# Patient Record
Sex: FEMALE | Race: OTHER | HISPANIC OR LATINO | URBAN - METROPOLITAN AREA
[De-identification: names, ages, dates, MRNs, and addresses within clinical notes are randomized per-mention and may not be internally consistent; named-entity substitution may affect disease eponyms.]

---

## 2018-10-05 ENCOUNTER — INPATIENT (INPATIENT)
Facility: HOSPITAL | Age: 63
LOS: 1 days | Discharge: HOME | DRG: 871 | End: 2018-10-07
Attending: HOSPITALIST | Admitting: HOSPITALIST
Payer: COMMERCIAL

## 2018-10-05 VITALS
RESPIRATION RATE: 16 BRPM | SYSTOLIC BLOOD PRESSURE: 170 MMHG | WEIGHT: 156.97 LBS | TEMPERATURE: 99 F | HEART RATE: 91 BPM | OXYGEN SATURATION: 94 % | HEIGHT: 62 IN | DIASTOLIC BLOOD PRESSURE: 87 MMHG

## 2018-10-05 DIAGNOSIS — Z29.9 ENCOUNTER FOR PROPHYLACTIC MEASURES, UNSPECIFIED: ICD-10-CM

## 2018-10-05 DIAGNOSIS — Z98.89 OTHER SPECIFIED POSTPROCEDURAL STATES: Chronic | ICD-10-CM

## 2018-10-05 DIAGNOSIS — E11.9 TYPE 2 DIABETES MELLITUS WITHOUT COMPLICATIONS: ICD-10-CM

## 2018-10-05 DIAGNOSIS — J18.9 PNEUMONIA, UNSPECIFIED ORGANISM: ICD-10-CM

## 2018-10-05 DIAGNOSIS — I10 ESSENTIAL (PRIMARY) HYPERTENSION: ICD-10-CM

## 2018-10-05 DIAGNOSIS — Z98.42 CATARACT EXTRACTION STATUS, LEFT EYE: Chronic | ICD-10-CM

## 2018-10-05 DIAGNOSIS — Z90.49 ACQUIRED ABSENCE OF OTHER SPECIFIED PARTS OF DIGESTIVE TRACT: Chronic | ICD-10-CM

## 2018-10-05 DIAGNOSIS — K21.9 GASTRO-ESOPHAGEAL REFLUX DISEASE WITHOUT ESOPHAGITIS: ICD-10-CM

## 2018-10-05 DIAGNOSIS — E87.2 ACIDOSIS: ICD-10-CM

## 2018-10-05 DIAGNOSIS — J45.909 UNSPECIFIED ASTHMA, UNCOMPLICATED: ICD-10-CM

## 2018-10-05 LAB
ALBUMIN SERPL ELPH-MCNC: 3.1 G/DL — LOW (ref 3.3–5)
ALP SERPL-CCNC: 129 U/L — HIGH (ref 40–120)
ALT FLD-CCNC: 27 U/L — SIGNIFICANT CHANGE UP (ref 12–78)
ANION GAP SERPL CALC-SCNC: 7 MMOL/L — SIGNIFICANT CHANGE UP (ref 5–17)
APPEARANCE UR: CLEAR — SIGNIFICANT CHANGE UP
AST SERPL-CCNC: 15 U/L — SIGNIFICANT CHANGE UP (ref 15–37)
BASOPHILS # BLD AUTO: 0 K/UL — SIGNIFICANT CHANGE UP (ref 0–0.2)
BASOPHILS NFR BLD AUTO: 0 % — SIGNIFICANT CHANGE UP (ref 0–2)
BILIRUB SERPL-MCNC: 0.3 MG/DL — SIGNIFICANT CHANGE UP (ref 0.2–1.2)
BILIRUB UR-MCNC: NEGATIVE — SIGNIFICANT CHANGE UP
BUN SERPL-MCNC: 11 MG/DL — SIGNIFICANT CHANGE UP (ref 7–23)
CALCIUM SERPL-MCNC: 9.1 MG/DL — SIGNIFICANT CHANGE UP (ref 8.5–10.1)
CHLORIDE SERPL-SCNC: 101 MMOL/L — SIGNIFICANT CHANGE UP (ref 96–108)
CO2 SERPL-SCNC: 31 MMOL/L — SIGNIFICANT CHANGE UP (ref 22–31)
COLOR SPEC: YELLOW — SIGNIFICANT CHANGE UP
CREAT SERPL-MCNC: 0.77 MG/DL — SIGNIFICANT CHANGE UP (ref 0.5–1.3)
DIFF PNL FLD: ABNORMAL
EOSINOPHIL # BLD AUTO: 0.2 K/UL — SIGNIFICANT CHANGE UP (ref 0–0.5)
EOSINOPHIL NFR BLD AUTO: 1 % — SIGNIFICANT CHANGE UP (ref 0–6)
GLUCOSE SERPL-MCNC: 283 MG/DL — HIGH (ref 70–99)
GLUCOSE UR QL: 300 MG/DL
HCT VFR BLD CALC: 40.2 % — SIGNIFICANT CHANGE UP (ref 34.5–45)
HGB BLD-MCNC: 13.3 G/DL — SIGNIFICANT CHANGE UP (ref 11.5–15.5)
KETONES UR-MCNC: NEGATIVE — SIGNIFICANT CHANGE UP
LACTATE SERPL-SCNC: 2.2 MMOL/L — HIGH (ref 0.7–2)
LACTATE SERPL-SCNC: 2.6 MMOL/L — HIGH (ref 0.7–2)
LEUKOCYTE ESTERASE UR-ACNC: NEGATIVE — SIGNIFICANT CHANGE UP
LYMPHOCYTES # BLD AUTO: 45 % — HIGH (ref 13–44)
LYMPHOCYTES # BLD AUTO: 8.82 K/UL — HIGH (ref 1–3.3)
MCHC RBC-ENTMCNC: 27.5 PG — SIGNIFICANT CHANGE UP (ref 27–34)
MCHC RBC-ENTMCNC: 33.1 GM/DL — SIGNIFICANT CHANGE UP (ref 32–36)
MCV RBC AUTO: 83.1 FL — SIGNIFICANT CHANGE UP (ref 80–100)
MONOCYTES # BLD AUTO: 0.59 K/UL — SIGNIFICANT CHANGE UP (ref 0–0.9)
MONOCYTES NFR BLD AUTO: 3 % — SIGNIFICANT CHANGE UP (ref 2–14)
NEUTROPHILS # BLD AUTO: 9.61 K/UL — HIGH (ref 1.8–7.4)
NEUTROPHILS NFR BLD AUTO: 49 % — SIGNIFICANT CHANGE UP (ref 43–77)
NITRITE UR-MCNC: NEGATIVE — SIGNIFICANT CHANGE UP
PH UR: 7 — SIGNIFICANT CHANGE UP (ref 5–8)
PLATELET # BLD AUTO: 396 K/UL — SIGNIFICANT CHANGE UP (ref 150–400)
POTASSIUM SERPL-MCNC: 4.1 MMOL/L — SIGNIFICANT CHANGE UP (ref 3.5–5.3)
POTASSIUM SERPL-SCNC: 4.1 MMOL/L — SIGNIFICANT CHANGE UP (ref 3.5–5.3)
PROT SERPL-MCNC: 7.6 G/DL — SIGNIFICANT CHANGE UP (ref 6–8.3)
PROT UR-MCNC: 25 MG/DL
RAPID RVP RESULT: DETECTED
RBC # BLD: 4.84 M/UL — SIGNIFICANT CHANGE UP (ref 3.8–5.2)
RBC # FLD: 14 % — SIGNIFICANT CHANGE UP (ref 10.3–14.5)
RV+EV RNA SPEC QL NAA+PROBE: DETECTED
SODIUM SERPL-SCNC: 139 MMOL/L — SIGNIFICANT CHANGE UP (ref 135–145)
SP GR SPEC: 1 — LOW (ref 1.01–1.02)
UROBILINOGEN FLD QL: NEGATIVE — SIGNIFICANT CHANGE UP
WBC # BLD: 19.61 K/UL — HIGH (ref 3.8–10.5)
WBC # FLD AUTO: 19.61 K/UL — HIGH (ref 3.8–10.5)

## 2018-10-05 PROCEDURE — 99285 EMERGENCY DEPT VISIT HI MDM: CPT

## 2018-10-05 PROCEDURE — 99223 1ST HOSP IP/OBS HIGH 75: CPT | Mod: GC,AI

## 2018-10-05 PROCEDURE — 93010 ELECTROCARDIOGRAM REPORT: CPT

## 2018-10-05 PROCEDURE — 71046 X-RAY EXAM CHEST 2 VIEWS: CPT | Mod: 26

## 2018-10-05 RX ORDER — ENOXAPARIN SODIUM 100 MG/ML
40 INJECTION SUBCUTANEOUS EVERY 24 HOURS
Qty: 0 | Refills: 0 | Status: DISCONTINUED | OUTPATIENT
Start: 2018-10-05 | End: 2018-10-07

## 2018-10-05 RX ORDER — IPRATROPIUM/ALBUTEROL SULFATE 18-103MCG
3 AEROSOL WITH ADAPTER (GRAM) INHALATION ONCE
Qty: 0 | Refills: 0 | Status: COMPLETED | OUTPATIENT
Start: 2018-10-05 | End: 2018-10-05

## 2018-10-05 RX ORDER — SODIUM CHLORIDE 9 MG/ML
1000 INJECTION INTRAMUSCULAR; INTRAVENOUS; SUBCUTANEOUS
Qty: 0 | Refills: 0 | Status: DISCONTINUED | OUTPATIENT
Start: 2018-10-05 | End: 2018-10-06

## 2018-10-05 RX ORDER — SODIUM CHLORIDE 9 MG/ML
1000 INJECTION, SOLUTION INTRAVENOUS
Qty: 0 | Refills: 0 | Status: DISCONTINUED | OUTPATIENT
Start: 2018-10-05 | End: 2018-10-07

## 2018-10-05 RX ORDER — PANTOPRAZOLE SODIUM 20 MG/1
40 TABLET, DELAYED RELEASE ORAL
Qty: 0 | Refills: 0 | Status: DISCONTINUED | OUTPATIENT
Start: 2018-10-05 | End: 2018-10-07

## 2018-10-05 RX ORDER — DEXTROSE 50 % IN WATER 50 %
25 SYRINGE (ML) INTRAVENOUS ONCE
Qty: 0 | Refills: 0 | Status: DISCONTINUED | OUTPATIENT
Start: 2018-10-05 | End: 2018-10-07

## 2018-10-05 RX ORDER — ERGOCALCIFEROL 1.25 MG/1
50000 CAPSULE ORAL
Qty: 0 | Refills: 0 | Status: DISCONTINUED | OUTPATIENT
Start: 2018-10-05 | End: 2018-10-07

## 2018-10-05 RX ORDER — CARVEDILOL PHOSPHATE 80 MG/1
10 CAPSULE, EXTENDED RELEASE ORAL EVERY 12 HOURS
Qty: 0 | Refills: 0 | Status: DISCONTINUED | OUTPATIENT
Start: 2018-10-05 | End: 2018-10-05

## 2018-10-05 RX ORDER — SODIUM CHLORIDE 9 MG/ML
1000 INJECTION INTRAMUSCULAR; INTRAVENOUS; SUBCUTANEOUS ONCE
Qty: 0 | Refills: 0 | Status: COMPLETED | OUTPATIENT
Start: 2018-10-05 | End: 2018-10-05

## 2018-10-05 RX ORDER — DEXTROSE 50 % IN WATER 50 %
15 SYRINGE (ML) INTRAVENOUS ONCE
Qty: 0 | Refills: 0 | Status: DISCONTINUED | OUTPATIENT
Start: 2018-10-05 | End: 2018-10-07

## 2018-10-05 RX ORDER — ALBUTEROL 90 UG/1
2 AEROSOL, METERED ORAL EVERY 6 HOURS
Qty: 0 | Refills: 0 | Status: DISCONTINUED | OUTPATIENT
Start: 2018-10-05 | End: 2018-10-06

## 2018-10-05 RX ORDER — INSULIN LISPRO 100/ML
VIAL (ML) SUBCUTANEOUS
Qty: 0 | Refills: 0 | Status: DISCONTINUED | OUTPATIENT
Start: 2018-10-05 | End: 2018-10-07

## 2018-10-05 RX ORDER — LISINOPRIL 2.5 MG/1
40 TABLET ORAL DAILY
Qty: 0 | Refills: 0 | Status: DISCONTINUED | OUTPATIENT
Start: 2018-10-05 | End: 2018-10-07

## 2018-10-05 RX ORDER — GABAPENTIN 400 MG/1
100 CAPSULE ORAL AT BEDTIME
Qty: 0 | Refills: 0 | Status: DISCONTINUED | OUTPATIENT
Start: 2018-10-05 | End: 2018-10-07

## 2018-10-05 RX ORDER — SODIUM CHLORIDE 9 MG/ML
3 INJECTION INTRAMUSCULAR; INTRAVENOUS; SUBCUTANEOUS ONCE
Qty: 0 | Refills: 0 | Status: COMPLETED | OUTPATIENT
Start: 2018-10-05 | End: 2018-10-05

## 2018-10-05 RX ORDER — DEXTROSE 50 % IN WATER 50 %
12.5 SYRINGE (ML) INTRAVENOUS ONCE
Qty: 0 | Refills: 0 | Status: DISCONTINUED | OUTPATIENT
Start: 2018-10-05 | End: 2018-10-07

## 2018-10-05 RX ORDER — SODIUM CHLORIDE 9 MG/ML
1000 INJECTION INTRAMUSCULAR; INTRAVENOUS; SUBCUTANEOUS
Qty: 0 | Refills: 0 | Status: COMPLETED | OUTPATIENT
Start: 2018-10-05 | End: 2018-10-05

## 2018-10-05 RX ORDER — GLUCAGON INJECTION, SOLUTION 0.5 MG/.1ML
1 INJECTION, SOLUTION SUBCUTANEOUS ONCE
Qty: 0 | Refills: 0 | Status: DISCONTINUED | OUTPATIENT
Start: 2018-10-05 | End: 2018-10-07

## 2018-10-05 RX ORDER — BUDESONIDE AND FORMOTEROL FUMARATE DIHYDRATE 160; 4.5 UG/1; UG/1
2 AEROSOL RESPIRATORY (INHALATION)
Qty: 0 | Refills: 0 | Status: DISCONTINUED | OUTPATIENT
Start: 2018-10-05 | End: 2018-10-07

## 2018-10-05 RX ORDER — CARVEDILOL PHOSPHATE 80 MG/1
6.25 CAPSULE, EXTENDED RELEASE ORAL EVERY 12 HOURS
Qty: 0 | Refills: 0 | Status: DISCONTINUED | OUTPATIENT
Start: 2018-10-05 | End: 2018-10-07

## 2018-10-05 RX ORDER — ASPIRIN/CALCIUM CARB/MAGNESIUM 324 MG
81 TABLET ORAL DAILY
Qty: 0 | Refills: 0 | Status: DISCONTINUED | OUTPATIENT
Start: 2018-10-05 | End: 2018-10-07

## 2018-10-05 RX ORDER — DILTIAZEM HCL 120 MG
180 CAPSULE, EXT RELEASE 24 HR ORAL DAILY
Qty: 0 | Refills: 0 | Status: DISCONTINUED | OUTPATIENT
Start: 2018-10-05 | End: 2018-10-07

## 2018-10-05 RX ADMIN — SODIUM CHLORIDE 1000 MILLILITER(S): 9 INJECTION INTRAMUSCULAR; INTRAVENOUS; SUBCUTANEOUS at 18:30

## 2018-10-05 RX ADMIN — Medication 125 MILLIGRAM(S): at 17:30

## 2018-10-05 RX ADMIN — SODIUM CHLORIDE 1000 MILLILITER(S): 9 INJECTION INTRAMUSCULAR; INTRAVENOUS; SUBCUTANEOUS at 20:00

## 2018-10-05 RX ADMIN — SODIUM CHLORIDE 3 MILLILITER(S): 9 INJECTION INTRAMUSCULAR; INTRAVENOUS; SUBCUTANEOUS at 18:00

## 2018-10-05 RX ADMIN — SODIUM CHLORIDE 1000 MILLILITER(S): 9 INJECTION INTRAMUSCULAR; INTRAVENOUS; SUBCUTANEOUS at 18:00

## 2018-10-05 RX ADMIN — SODIUM CHLORIDE 1000 MILLILITER(S): 9 INJECTION INTRAMUSCULAR; INTRAVENOUS; SUBCUTANEOUS at 17:30

## 2018-10-05 RX ADMIN — SODIUM CHLORIDE 1000 MILLILITER(S): 9 INJECTION INTRAMUSCULAR; INTRAVENOUS; SUBCUTANEOUS at 18:58

## 2018-10-05 RX ADMIN — SODIUM CHLORIDE 1000 MILLILITER(S): 9 INJECTION INTRAMUSCULAR; INTRAVENOUS; SUBCUTANEOUS at 19:00

## 2018-10-05 RX ADMIN — Medication 3 MILLILITER(S): at 17:33

## 2018-10-05 NOTE — ED PROVIDER NOTE - ATTENDING CONTRIBUTION TO CARE
pt c/o 5 days of sob, productive cough and wheeze. no fevers, ha, cp, abd pain, n/v.  wd, wn female, nad, s1s2 rrr, lungs cta, abd soft, nt

## 2018-10-05 NOTE — H&P ADULT - PROBLEM SELECTOR PLAN 7
Stable  - continue with home med Stable  - continue with Protonix (conversion for lansoprazole)  - Pt states also takes ranitidine, to be f/u in the morning with pharmacy

## 2018-10-05 NOTE — H&P ADULT - NSHPREVIEWOFSYSTEMS_GEN_ALL_CORE
ROS:  Constitutional: Admits fever; denies chills  Respiratory: denies SOB, MÁRQUEZ, cough, sputum production, wheezing, hemoptysis  Cardiovascular: denies chest pain, palpitations, edema  Gastrointestinal: admits nausea; denies vomiting, diarrhea, constipation, abdominal pain  Genitourinary: denies dysuria, hematuria   Musculoskeletal: denies myalgias, joint swelling, muscle weakness  Neurologic: admits headache; denies weakness, dizziness  ROS negative except as noted above

## 2018-10-05 NOTE — H&P ADULT - PROBLEM SELECTOR PLAN 1
Pt presenting with leukocytosis, elevated lactate with RLL PNA concerning for sepsis  - Pt presenting with leukocytosis, elevated lactate with RLL PNA concerning for sepsis  - Admit to [facility]  - positive entero/rhinovirus possibly superimposed on ?bacterial pna  - received Levaquin 500mg in the ED  - Will continue Levaquin  - Continue IVF   - f/u bcx  - Trend fevers, CBC  - Monitor O2 stat, supportive oxygen therapy as needed

## 2018-10-05 NOTE — H&P ADULT - PROBLEM SELECTOR PLAN 4
Type 2 DM  - hyperglycemia likely 2/2 infection   - hold home medication  - Start on low dose insulin sliding scale, hypoglycemia protocol  - Carb consistent diet  - f/u HbA1c

## 2018-10-05 NOTE — H&P ADULT - NSHPSOCIALHISTORY_GEN_ALL_CORE
Social Hx:  , lives at home  Denies smoking hx  Etoh socially  Denies drug use  Able to ambulate independently at baseline

## 2018-10-05 NOTE — H&P ADULT - HISTORY OF PRESENT ILLNESS
64 y/o F with hx of asthma, DM, HTN, GERD presents with c/o productive cough x 5 days. Pt states that cough is productive of green phlegm, now turning clear with runny nose, mild headache and nausea but denies vomiting. Admits to possible fever for the past few days, denies taking temperature at home. Pt states that she has cough is worse at night when lying down. States that she has been using her ventolin inhaler daily with mild temporary relief. Reports at her therapists office many people were coughing. Pt denies chills, , SOB, CP, palpations, v/d, recent travel, chest pain, abdominal pain,  myalgias, hemoptysis, recent plane travel/immobilization, hx of admissions/intubations for asthma or other symptoms.    In the ED, patient was hypertensive (170/87), stating 94% on RA otherwise hemodynamically stable. Labs significant for leukocytosis (19.61), lactate acidemia (2.6, 2.2), hyperglycemia (283), mild hypoalbuminemia (3.1). UA with proteinuria (25) and glycosuria (300). RVP positive for entero/rhinovirus. CXR pending official read, prelim read appears to have RLL consolidation. EKG NSR 90. Pt received Levaquin 500mg IV, 2L NS, Duoneb and solumedrol 125mg IV.     Family hx of DM

## 2018-10-05 NOTE — H&P ADULT - PROBLEM SELECTOR PLAN 6
Stable  - continue home med with hold parameter Stable  - continue lisinopril (conversion for ramipril) Stable  - continue lisinopril (conversion for ramipril), Cardizem, carvedilol with hold parameters

## 2018-10-05 NOTE — ED PROVIDER NOTE - PROGRESS NOTE DETAILS
Pt examined by ED attending, Dr. Jones who agreed with disposition and plan. Handoff given to hospitalist, Dr. Langford, discussed case and results, accepted admission, will admit to Dr. Guthrie. Pt examined by ED attending, Dr. Jones who agreed with disposition and plan.  CXR shows RLL pna with elevated wbc, started on iv levaquin, given IVF, repeat lactate

## 2018-10-05 NOTE — ED PROVIDER NOTE - OBJECTIVE STATEMENT
64 y/o F with hx of asthma, DM, HTN, GERD presents with c/o productive cough x 5 days. Pt states that cough is productive of green phlegm with occasional mild chest tightness, runny nose, generalized weakness and itchy throat. Pt states that she has cough attacks and is worse at night. Pt has been taking tussin without relief. States that she has been using her ventolin inhaler daily with mild temporary relief. Pt denies fever, chills, v/d, recent travel, chest pain, abdominal pain, dizziness, myalgias, hemoptysis, smoking, leg swelling/pain, recent plane travel/immobilization, hx of admissions/intubations for asthma or other symptoms. States that neighbor in same building also has a cough. 64 y/o F with hx of asthma, DM, HTN, GERD presents with c/o productive cough x 5 days. Pt states that cough is productive of green phlegm with occasional mild chest tightness, runny nose, generalized weakness and itchy throat. Pt states that she has cough attacks and is worse at night. Pt has been taking tussin without relief. States that she has been using her ventolin inhaler daily with mild temporary relief. Pt denies fever, chills, v/d, recent travel, chest pain, abdominal pain, dizziness, myalgias, hemoptysis, smoking, leg swelling/pain, recent plane travel/immobilization, hx of admissions/intubations for asthma or other symptoms. States that neighbor in same building also has a cough.  PMD: Dr. Jewel Bailey

## 2018-10-05 NOTE — ED ADULT NURSE NOTE - OBJECTIVE STATEMENT
Patient received in stretcher c/o increased shortness of breath, cough and subjective fever(chills) x several days. Patient denies chest pain @ this time.

## 2018-10-05 NOTE — ED ADULT NURSE NOTE - NSIMPLEMENTINTERV_GEN_ALL_ED
Implemented All Fall Risk Interventions:  [place] to call system. Call bell, personal items and telephone within reach. Instruct patient to call for assistance. Room bathroom lighting operational. Non-slip footwear when patient is off stretcher. Physically safe environment: no spills, clutter or unnecessary equipment. Stretcher in lowest position, wheels locked, appropriate side rails in place. Provide visual cue, wrist band, yellow gown, etc. Monitor gait and stability. Monitor for mental status changes and reorient to person, place, and time. Review medications for side effects contributing to fall risk. Reinforce activity limits and safety measures with patient and family.

## 2018-10-05 NOTE — H&P ADULT - NSHPPHYSICALEXAM_GEN_ALL_CORE
Vital Signs Last 24 Hrs  T(C): 36.7 (10-05-18 @ 19:50), Max: 37 (10-05-18 @ 15:53)  T(F): 98.1 (10-05-18 @ 19:50), Max: 98.6 (10-05-18 @ 15:53)  HR: 94 (10-05-18 @ 19:50) (91 - 98)  BP: 150/86 (10-05-18 @ 19:50) (150/86 - 170/87)  BP(mean): --  RR: 20 (10-05-18 @ 19:50) (16 - 20)  SpO2: 95% (10-05-18 @ 19:50) (94% - 95%)    Physical Exam:  General: Well developed, well nourished, NAD  HEENT: NCAT, PERRLA, EOMI bl, moist mucous membranes   Neck: Supple, nontender, no mass  Neurology: A&Ox3, nonfocal  Respiratory: course breath sounds, R>L  CV: RRR, +S1/S2, no murmurs  Abdominal: Soft, NT, ND +BSx4  Extremities: No C/C/E, + peripheral pulses  MSK: no joint erythema or warmth, no joint swelling   Skin: warm, dry, normal color

## 2018-10-05 NOTE — H&P ADULT - PROBLEM SELECTOR PLAN 8
DVt ppx: lovenox sq DVT ppx: lovenox sq  --all of patient's medications were not able to be confirmed as her main pharmacy ([facility] in [place], [phone]) was closed, to be f/u in the AM

## 2018-10-05 NOTE — H&P ADULT - ASSESSMENT
64 y/o F with hx of asthma, DM, HTN, GERD presents with c/o productive cough admitted with sepsis 2/2 RLL CAP 64 y/o F with hx of asthma, DM, HTN, GERD presents with c/o productive cough admitted with sepsis 2/2 RLL CAP, possible superimposed viral infection

## 2018-10-06 DIAGNOSIS — Z09 ENCOUNTER FOR FOLLOW-UP EXAMINATION AFTER COMPLETED TREATMENT FOR CONDITIONS OTHER THAN MALIGNANT NEOPLASM: ICD-10-CM

## 2018-10-06 DIAGNOSIS — J22 UNSPECIFIED ACUTE LOWER RESPIRATORY INFECTION: ICD-10-CM

## 2018-10-06 LAB
ANION GAP SERPL CALC-SCNC: 10 MMOL/L — SIGNIFICANT CHANGE UP (ref 5–17)
BASOPHILS # BLD AUTO: 0 K/UL — SIGNIFICANT CHANGE UP (ref 0–0.2)
BASOPHILS NFR BLD AUTO: 0 % — SIGNIFICANT CHANGE UP (ref 0–2)
BUN SERPL-MCNC: 13 MG/DL — SIGNIFICANT CHANGE UP (ref 7–23)
CALCIUM SERPL-MCNC: 8.6 MG/DL — SIGNIFICANT CHANGE UP (ref 8.5–10.1)
CHLORIDE SERPL-SCNC: 102 MMOL/L — SIGNIFICANT CHANGE UP (ref 96–108)
CO2 SERPL-SCNC: 28 MMOL/L — SIGNIFICANT CHANGE UP (ref 22–31)
CREAT SERPL-MCNC: 0.68 MG/DL — SIGNIFICANT CHANGE UP (ref 0.5–1.3)
EOSINOPHIL # BLD AUTO: 0 K/UL — SIGNIFICANT CHANGE UP (ref 0–0.5)
EOSINOPHIL NFR BLD AUTO: 0 % — SIGNIFICANT CHANGE UP (ref 0–6)
GLUCOSE SERPL-MCNC: 318 MG/DL — HIGH (ref 70–99)
HBA1C BLD-MCNC: 9.3 % — HIGH (ref 4–5.6)
HCT VFR BLD CALC: 38.3 % — SIGNIFICANT CHANGE UP (ref 34.5–45)
HGB BLD-MCNC: 12.5 G/DL — SIGNIFICANT CHANGE UP (ref 11.5–15.5)
LACTATE SERPL-SCNC: 2.1 MMOL/L — HIGH (ref 0.7–2)
LACTATE SERPL-SCNC: 4.4 MMOL/L — CRITICAL HIGH (ref 0.7–2)
LYMPHOCYTES # BLD AUTO: 33 % — SIGNIFICANT CHANGE UP (ref 13–44)
LYMPHOCYTES # BLD AUTO: 7.67 K/UL — HIGH (ref 1–3.3)
MCHC RBC-ENTMCNC: 26.9 PG — LOW (ref 27–34)
MCHC RBC-ENTMCNC: 32.6 GM/DL — SIGNIFICANT CHANGE UP (ref 32–36)
MCV RBC AUTO: 82.5 FL — SIGNIFICANT CHANGE UP (ref 80–100)
MONOCYTES # BLD AUTO: 0.23 K/UL — SIGNIFICANT CHANGE UP (ref 0–0.9)
MONOCYTES NFR BLD AUTO: 1 % — LOW (ref 2–14)
NEUTROPHILS # BLD AUTO: 14.88 K/UL — HIGH (ref 1.8–7.4)
NEUTROPHILS NFR BLD AUTO: 58 % — SIGNIFICANT CHANGE UP (ref 43–77)
PLATELET # BLD AUTO: 397 K/UL — SIGNIFICANT CHANGE UP (ref 150–400)
POTASSIUM SERPL-MCNC: 3.4 MMOL/L — LOW (ref 3.5–5.3)
POTASSIUM SERPL-SCNC: 3.4 MMOL/L — LOW (ref 3.5–5.3)
RBC # BLD: 4.64 M/UL — SIGNIFICANT CHANGE UP (ref 3.8–5.2)
RBC # FLD: 13.6 % — SIGNIFICANT CHANGE UP (ref 10.3–14.5)
SODIUM SERPL-SCNC: 140 MMOL/L — SIGNIFICANT CHANGE UP (ref 135–145)
WBC # BLD: 23.25 K/UL — HIGH (ref 3.8–10.5)
WBC # FLD AUTO: 23.25 K/UL — HIGH (ref 3.8–10.5)

## 2018-10-06 PROCEDURE — 99232 SBSQ HOSP IP/OBS MODERATE 35: CPT

## 2018-10-06 RX ORDER — SODIUM CHLORIDE 9 MG/ML
1000 INJECTION INTRAMUSCULAR; INTRAVENOUS; SUBCUTANEOUS
Qty: 0 | Refills: 0 | Status: DISCONTINUED | OUTPATIENT
Start: 2018-10-06 | End: 2018-10-07

## 2018-10-06 RX ORDER — INSULIN GLARGINE 100 [IU]/ML
38 INJECTION, SOLUTION SUBCUTANEOUS AT BEDTIME
Qty: 0 | Refills: 0 | Status: DISCONTINUED | OUTPATIENT
Start: 2018-10-06 | End: 2018-10-07

## 2018-10-06 RX ORDER — SODIUM CHLORIDE 9 MG/ML
250 INJECTION INTRAMUSCULAR; INTRAVENOUS; SUBCUTANEOUS ONCE
Qty: 0 | Refills: 0 | Status: COMPLETED | OUTPATIENT
Start: 2018-10-06 | End: 2018-10-06

## 2018-10-06 RX ORDER — SODIUM CHLORIDE 9 MG/ML
500 INJECTION INTRAMUSCULAR; INTRAVENOUS; SUBCUTANEOUS ONCE
Qty: 0 | Refills: 0 | Status: COMPLETED | OUTPATIENT
Start: 2018-10-06 | End: 2018-10-06

## 2018-10-06 RX ORDER — FAMOTIDINE 10 MG/ML
20 INJECTION INTRAVENOUS DAILY
Qty: 0 | Refills: 0 | Status: DISCONTINUED | OUTPATIENT
Start: 2018-10-06 | End: 2018-10-07

## 2018-10-06 RX ORDER — INFLUENZA VIRUS VACCINE 15; 15; 15; 15 UG/.5ML; UG/.5ML; UG/.5ML; UG/.5ML
0.5 SUSPENSION INTRAMUSCULAR ONCE
Qty: 0 | Refills: 0 | Status: DISCONTINUED | OUTPATIENT
Start: 2018-10-06 | End: 2018-10-07

## 2018-10-06 RX ORDER — IPRATROPIUM/ALBUTEROL SULFATE 18-103MCG
3 AEROSOL WITH ADAPTER (GRAM) INHALATION EVERY 6 HOURS
Qty: 0 | Refills: 0 | Status: DISCONTINUED | OUTPATIENT
Start: 2018-10-06 | End: 2018-10-07

## 2018-10-06 RX ORDER — POTASSIUM CHLORIDE 20 MEQ
40 PACKET (EA) ORAL ONCE
Qty: 0 | Refills: 0 | Status: COMPLETED | OUTPATIENT
Start: 2018-10-06 | End: 2018-10-06

## 2018-10-06 RX ADMIN — SODIUM CHLORIDE 500 MILLILITER(S): 9 INJECTION INTRAMUSCULAR; INTRAVENOUS; SUBCUTANEOUS at 01:21

## 2018-10-06 RX ADMIN — Medication 3: at 12:34

## 2018-10-06 RX ADMIN — Medication 3: at 08:44

## 2018-10-06 RX ADMIN — Medication 6: at 22:16

## 2018-10-06 RX ADMIN — Medication 81 MILLIGRAM(S): at 11:09

## 2018-10-06 RX ADMIN — BUDESONIDE AND FORMOTEROL FUMARATE DIHYDRATE 2 PUFF(S): 160; 4.5 AEROSOL RESPIRATORY (INHALATION) at 19:30

## 2018-10-06 RX ADMIN — SODIUM CHLORIDE 75 MILLILITER(S): 9 INJECTION INTRAMUSCULAR; INTRAVENOUS; SUBCUTANEOUS at 02:28

## 2018-10-06 RX ADMIN — CARVEDILOL PHOSPHATE 6.25 MILLIGRAM(S): 80 CAPSULE, EXTENDED RELEASE ORAL at 06:14

## 2018-10-06 RX ADMIN — FAMOTIDINE 20 MILLIGRAM(S): 10 INJECTION INTRAVENOUS at 11:09

## 2018-10-06 RX ADMIN — Medication 1 TABLET(S): at 11:09

## 2018-10-06 RX ADMIN — ERGOCALCIFEROL 50000 UNIT(S): 1.25 CAPSULE ORAL at 11:09

## 2018-10-06 RX ADMIN — BUDESONIDE AND FORMOTEROL FUMARATE DIHYDRATE 2 PUFF(S): 160; 4.5 AEROSOL RESPIRATORY (INHALATION) at 06:14

## 2018-10-06 RX ADMIN — Medication 3: at 17:43

## 2018-10-06 RX ADMIN — Medication 40 MILLIEQUIVALENT(S): at 09:03

## 2018-10-06 RX ADMIN — GABAPENTIN 100 MILLIGRAM(S): 400 CAPSULE ORAL at 21:52

## 2018-10-06 RX ADMIN — ALBUTEROL 2 PUFF(S): 90 AEROSOL, METERED ORAL at 02:30

## 2018-10-06 RX ADMIN — Medication 40 MILLIGRAM(S): at 17:50

## 2018-10-06 RX ADMIN — INSULIN GLARGINE 38 UNIT(S): 100 INJECTION, SOLUTION SUBCUTANEOUS at 22:15

## 2018-10-06 RX ADMIN — CARVEDILOL PHOSPHATE 6.25 MILLIGRAM(S): 80 CAPSULE, EXTENDED RELEASE ORAL at 17:24

## 2018-10-06 RX ADMIN — SODIUM CHLORIDE 500 MILLILITER(S): 9 INJECTION INTRAMUSCULAR; INTRAVENOUS; SUBCUTANEOUS at 08:02

## 2018-10-06 NOTE — CONSULT NOTE ADULT - SUBJECTIVE AND OBJECTIVE BOX
Date/Time Patient Seen:  		  Referring MD:   Data Reviewed	       Patient is a 63y old  Female who presents with a chief complaint of Pneumonia (06 Oct 2018 08:06)      Subjective/HPI    in bed  seen and examined  vs and meds reviewed    H and P reviewed    medical records reviewed    imaging and labs reviewed      cough  wheezing  sob    hx of lung surgery by Dr. Handy - 7 years ago    H&P Adult [Charted Location: [facility] 3WES 361 W1] [Authored: 05-Oct-2018 21:20]- for Visit: [id number], Complete, Revised, Signed in Full, General    History and Physical:   Outpatient Providers	PMD: Dr. Lynn Holloway  Pulm: Dr. Wray ([facility])       History of Present Illness:  Reason for Admission: Pneumonia  History of Present Illness:   62 y/o F with hx of asthma, DM, HTN, GERD presents with c/o productive cough x 5 days. Pt states that cough is productive of green phlegm, now turning clear with runny nose, mild headache and nausea but denies vomiting. Admits to possible fever for the past few days, denies taking temperature at home. Pt states that she has cough is worse at night when lying down. States that she has been using her ventolin inhaler daily with mild temporary relief. Reports at her therapists office many people were coughing. Pt denies chills, , SOB, CP, palpations, v/d, recent travel, chest pain, abdominal pain,  myalgias, hemoptysis, recent plane travel/immobilization, hx of admissions/intubations for asthma or other symptoms.    In the ED, patient was hypertensive (170/87), stating 94% on RA otherwise hemodynamically stable. Labs significant for leukocytosis (19.61), lactate acidemia (2.6, 2.2), hyperglycemia (283), mild hypoalbuminemia (3.1). UA with proteinuria (25) and glycosuria (300). RVP positive for entero/rhinovirus. CXR pending official read, prelim read appears to have RLL consolidation. EKG NSR 90. Pt received Levaquin 500mg IV, 2L NS, Duoneb and solumedrol 125mg IV.      PAST MEDICAL & SURGICAL HISTORY:  Gastroesophageal reflux disease without esophagitis  Diabetes  HTN (hypertension)  Asthma  History of left cataract surgery  H/O  section  History of cholecystectomy  No significant past surgical history        Medication list         MEDICATIONS  (STANDING):  ALBUTerol/ipratropium for Nebulization 3 milliLiter(s) Nebulizer every 6 hours  aspirin enteric coated 81 milliGRAM(s) Oral daily  buDESOnide  80 MICROgram(s)/formoterol 4.5 MICROgram(s) Inhaler 2 Puff(s) Inhalation two times a day  carvedilol 6.25 milliGRAM(s) Oral every 12 hours  dextrose 5%. 1000 milliLiter(s) (50 mL/Hr) IV Continuous <Continuous>  dextrose 50% Injectable 12.5 Gram(s) IV Push once  dextrose 50% Injectable 25 Gram(s) IV Push once  dextrose 50% Injectable 25 Gram(s) IV Push once  diltiazem    milliGRAM(s) Oral daily  enoxaparin Injectable 40 milliGRAM(s) SubCutaneous every 24 hours  ergocalciferol 10805 Unit(s) Oral every week  famotidine    Tablet 20 milliGRAM(s) Oral daily  gabapentin 100 milliGRAM(s) Oral at bedtime  influenza   Vaccine 0.5 milliLiter(s) IntraMuscular once  insulin glargine Injectable (LANTUS) 38 Unit(s) SubCutaneous at bedtime  insulin lispro (HumaLOG) corrective regimen sliding scale   SubCutaneous Before meals and at bedtime  levoFLOXacin IVPB 500 milliGRAM(s) IV Intermittent every 24 hours  lisinopril 40 milliGRAM(s) Oral daily  methylPREDNISolone sodium succinate Injectable 40 milliGRAM(s) IV Push every 12 hours  multivitamin 1 Tablet(s) Oral daily  pantoprazole    Tablet 40 milliGRAM(s) Oral before breakfast  sodium chloride 0.9%. 1000 milliLiter(s) (75 mL/Hr) IV Continuous <Continuous>    MEDICATIONS  (PRN):  dextrose 40% Gel 15 Gram(s) Oral once PRN Blood Glucose LESS THAN 70 milliGRAM(s)/deciliter  glucagon  Injectable 1 milliGRAM(s) IntraMuscular once PRN Glucose LESS THAN 70 milligrams/deciliter         Vitals log        ICU Vital Signs Last 24 Hrs  T(C): 36.6 (06 Oct 2018 06:14), Max: 37 (05 Oct 2018 15:53)  T(F): 97.9 (06 Oct 2018 06:14), Max: 98.6 (05 Oct 2018 15:53)  HR: 107 (06 Oct 2018 06:14) (91 - 107)  BP: 144/76 (06 Oct 2018 06:14) (144/76 - 170/87)  BP(mean): --  ABP: --  ABP(mean): --  RR: 17 (06 Oct 2018 06:14) (16 - 20)  SpO2: 92% (06 Oct 2018 06:14) (92% - 95%)           Input and Output:  I&O's Detail    05 Oct 2018 07:01  -  06 Oct 2018 07:00  --------------------------------------------------------  IN:    sodium chloride 0.9%: 450 mL    Sodium Chloride 0.9% IV Bolus: 500 mL  Total IN: 950 mL    OUT:  Total OUT: 0 mL    Total NET: 950 mL      [facility]  lives at home    non smoker at present        Lab Data                        12.5   23.25 )-----------( 397      ( 06 Oct 2018 06:48 )             38.3     10-06    140  |  102  |  13  ----------------------------<  318<H>  3.4<L>   |  28  |  0.68    Ca    8.6      06 Oct 2018 06:48    TPro  7.6  /  Alb  3.1<L>  /  TBili  0.3  /  DBili  x   /  AST  15  /  ALT  27  /  AlkPhos  129<H>  10-05            Review of Systems	      Objective     Physical Examination    heart s1s2  lung dec BS  abd soft      Pertinent Lab findings & Imaging      Courtney:  NO   Adequate UO     I&O's Detail    05 Oct 2018 07:01  -  06 Oct 2018 07:00  --------------------------------------------------------  IN:    sodium chloride 0.9%: 450 mL    Sodium Chloride 0.9% IV Bolus: 500 mL  Total IN: 950 mL    OUT:  Total OUT: 0 mL    Total NET: 950 mL               Discussed with:     Cultures:	        Radiology

## 2018-10-06 NOTE — PROVIDER CONTACT NOTE (CRITICAL VALUE NOTIFICATION) - ACTION/TREATMENT ORDERED:
MD made aware. NS 0.9% 500ml bolus x 1 as per MD ordered. Will continue to monitor MD made aware. NS 0.9% 500ml bolus x 1 as per MD ordered. Repeat lactate in AM. Will continue to monitor

## 2018-10-06 NOTE — CONSULT NOTE ADULT - PROBLEM SELECTOR RECOMMENDATION 3
surgery hx   right lung surgery  Dr. Handy thoracic surgery - in 2013  post op changes noted on ct and cxr

## 2018-10-06 NOTE — PROGRESS NOTE ADULT - SUBJECTIVE AND OBJECTIVE BOX
CHIEF COMPLAINT/INTERVAL HISTORY:  Pt. seen and evaluated for sepsis 2/2 enterovirus.  Pt. reports feeling better this morning.  Tolerating IV fluids and antibiotics.  Reports having some wheezing, but no SOB.      REVIEW OF SYSTEMS:  No fever, CP, SOB, or abdominal pain.      Vital Signs Last 24 Hrs  T(C): 36.6 (06 Oct 2018 06:14), Max: 37 (05 Oct 2018 15:53)  T(F): 97.9 (06 Oct 2018 06:14), Max: 98.6 (05 Oct 2018 15:53)  HR: 107 (06 Oct 2018 06:14) (91 - 107)  BP: 144/76 (06 Oct 2018 06:14) (144/76 - 170/87)  BP(mean): --  RR: 17 (06 Oct 2018 06:14) (16 - 20)  SpO2: 92% (06 Oct 2018 06:14) (92% - 95%)    PHYSICAL EXAM:  GENERAL: NAD  HEENT: EOMI, hearing normal, conjunctiva and sclera clear  Chest: mild diffuse exp. wheezing  CV: S1S2, RRR,   GI: soft, +BS, NT/ND  Musculoskeletal: trace LE edema  Psychiatric: affect nL, mood nL  Skin: warm and dry    LABS:                        12.5   23.25 )-----------( 397      ( 06 Oct 2018 06:48 )             38.3     10-06    140  |  102  |  13  ----------------------------<  318<H>  3.4<L>   |  28  |  0.68    Ca    8.6      06 Oct 2018 06:48    TPro  7.6  /  Alb  3.1<L>  /  TBili  0.3  /  DBili  x   /  AST  15  /  ALT  27  /  AlkPhos  129<H>  10-05      Urinalysis Basic - ( 05 Oct 2018 17:40 )    Color: Yellow / Appearance: Clear / S.005 / pH: x  Gluc: x / Ketone: Negative  / Bili: Negative / Urobili: Negative   Blood: x / Protein: 25 mg/dL / Nitrite: Negative   Leuk Esterase: Negative / RBC: 0-2 /HPF / WBC 0-2   Sq Epi: x / Non Sq Epi: Few / Bacteria: Negative        Assessment and Plan:  -Sepsis 2/2 entero/rhinovirus and possible superimposed bacterial pneumonia:  Continue Levaquin 500mg IV daily.  NS@75cc/hr x 12 hours.  Check blood cx.  Pulmonary consult.  -Asthma:  Start solu-medrol 40mg IV Q12h, Symbicort inh BID, and Duoneb tx Q6h  -Lactic acidemia:  improved.  Pt. reports feeling better.  Will no longer trend.    -Type 2 DM:  start Lantus 38 units SQ QHS and humalog sliding scale  -HTN: continue Coreg 6.25mg PO Q12h, Cardizem CD 180mg PO daily, and lisinopril 40mg PO daily  -GERD:  continue protonix and pecid  -VTE ppx: Lovenox 40mg SQ daily

## 2018-10-06 NOTE — PROVIDER CONTACT NOTE (OTHER) - ASSESSMENT
/80, P 93, RR 18, O2 93% on RA temp 98.2  Patient denies any dizziness, lightheadedness, nausea, vomiting, sob, chest pain, no sweating observed. Patient observed eating cake at bedside. Education given.

## 2018-10-06 NOTE — CONSULT NOTE ADULT - PROBLEM SELECTOR RECOMMENDATION 9
asthma  symbicort  NEBS  on Tudorza at home, may resume on dc  on Systemic steroids, will leave on current dose, will taper in am  monitor FS  will check CRP IgE and Eosinophil count  will need seasonal vaccination once better

## 2018-10-07 ENCOUNTER — TRANSCRIPTION ENCOUNTER (OUTPATIENT)
Age: 63
End: 2018-10-07

## 2018-10-07 VITALS
OXYGEN SATURATION: 94 % | RESPIRATION RATE: 17 BRPM | HEART RATE: 64 BPM | TEMPERATURE: 98 F | SYSTOLIC BLOOD PRESSURE: 164 MMHG | DIASTOLIC BLOOD PRESSURE: 84 MMHG

## 2018-10-07 LAB
ANION GAP SERPL CALC-SCNC: 8 MMOL/L — SIGNIFICANT CHANGE UP (ref 5–17)
BUN SERPL-MCNC: 17 MG/DL — SIGNIFICANT CHANGE UP (ref 7–23)
CALCIUM SERPL-MCNC: 9.2 MG/DL — SIGNIFICANT CHANGE UP (ref 8.5–10.1)
CHLORIDE SERPL-SCNC: 100 MMOL/L — SIGNIFICANT CHANGE UP (ref 96–108)
CO2 SERPL-SCNC: 30 MMOL/L — SIGNIFICANT CHANGE UP (ref 22–31)
CREAT SERPL-MCNC: 0.68 MG/DL — SIGNIFICANT CHANGE UP (ref 0.5–1.3)
GLUCOSE SERPL-MCNC: 317 MG/DL — HIGH (ref 70–99)
HCT VFR BLD CALC: 39 % — SIGNIFICANT CHANGE UP (ref 34.5–45)
HGB BLD-MCNC: 13.1 G/DL — SIGNIFICANT CHANGE UP (ref 11.5–15.5)
MAGNESIUM SERPL-MCNC: 1.9 MG/DL — SIGNIFICANT CHANGE UP (ref 1.6–2.6)
MCHC RBC-ENTMCNC: 27.2 PG — SIGNIFICANT CHANGE UP (ref 27–34)
MCHC RBC-ENTMCNC: 33.6 GM/DL — SIGNIFICANT CHANGE UP (ref 32–36)
MCV RBC AUTO: 81.1 FL — SIGNIFICANT CHANGE UP (ref 80–100)
NRBC # BLD: 0 /100 WBCS — SIGNIFICANT CHANGE UP (ref 0–0)
PLATELET # BLD AUTO: 429 K/UL — HIGH (ref 150–400)
POTASSIUM SERPL-MCNC: 3.7 MMOL/L — SIGNIFICANT CHANGE UP (ref 3.5–5.3)
POTASSIUM SERPL-SCNC: 3.7 MMOL/L — SIGNIFICANT CHANGE UP (ref 3.5–5.3)
RBC # BLD: 4.81 M/UL — SIGNIFICANT CHANGE UP (ref 3.8–5.2)
RBC # FLD: 13.9 % — SIGNIFICANT CHANGE UP (ref 10.3–14.5)
SODIUM SERPL-SCNC: 138 MMOL/L — SIGNIFICANT CHANGE UP (ref 135–145)
WBC # BLD: 29.48 K/UL — HIGH (ref 3.8–10.5)
WBC # FLD AUTO: 29.48 K/UL — HIGH (ref 3.8–10.5)

## 2018-10-07 PROCEDURE — 99239 HOSP IP/OBS DSCHRG MGMT >30: CPT

## 2018-10-07 RX ADMIN — Medication 1 TABLET(S): at 11:21

## 2018-10-07 RX ADMIN — PANTOPRAZOLE SODIUM 40 MILLIGRAM(S): 20 TABLET, DELAYED RELEASE ORAL at 05:03

## 2018-10-07 RX ADMIN — LISINOPRIL 40 MILLIGRAM(S): 2.5 TABLET ORAL at 05:03

## 2018-10-07 RX ADMIN — Medication 20 MILLIGRAM(S): at 11:21

## 2018-10-07 RX ADMIN — Medication 180 MILLIGRAM(S): at 05:03

## 2018-10-07 RX ADMIN — ENOXAPARIN SODIUM 40 MILLIGRAM(S): 100 INJECTION SUBCUTANEOUS at 05:17

## 2018-10-07 RX ADMIN — Medication 3 MILLILITER(S): at 01:55

## 2018-10-07 RX ADMIN — FAMOTIDINE 20 MILLIGRAM(S): 10 INJECTION INTRAVENOUS at 11:21

## 2018-10-07 RX ADMIN — Medication 40 MILLIGRAM(S): at 05:03

## 2018-10-07 RX ADMIN — Medication 3 MILLILITER(S): at 07:42

## 2018-10-07 RX ADMIN — CARVEDILOL PHOSPHATE 6.25 MILLIGRAM(S): 80 CAPSULE, EXTENDED RELEASE ORAL at 05:04

## 2018-10-07 RX ADMIN — Medication 81 MILLIGRAM(S): at 11:21

## 2018-10-07 RX ADMIN — Medication 3: at 08:29

## 2018-10-07 NOTE — DISCHARGE NOTE ADULT - PLAN OF CARE
complete course of antibiotics Follow up with your PMD in 1 week.  Activity as tolerable.  Low salt/limited carbohydrate diet complete course of prednisone Follow up with your pulmonologist in 2 weeks. Take your lantus at higher recommended dose along with your other oral hypoglycemic medications. Follow up with your PMD in 1 week.

## 2018-10-07 NOTE — PROGRESS NOTE ADULT - SUBJECTIVE AND OBJECTIVE BOX
CHIEF COMPLAINT/INTERVAL HISTORY:  Pt. seen and evaluated for sepsis 2/2 rhinovirus and asthma exacerbation.  Pt. is feeling better today.  Wheezing has improved.  No SOB.  Tolerating steroids and antibiotic.    REVIEW OF SYSTEMS:  No fever, CP, SOB, or abdominal pain.     Vital Signs Last 24 Hrs  T(C): 36.9 (07 Oct 2018 04:51), Max: 37.2 (06 Oct 2018 14:31)  T(F): 98.5 (07 Oct 2018 04:51), Max: 98.9 (06 Oct 2018 14:31)  HR: 82 (07 Oct 2018 07:43) (64 - 106)  BP: 164/84 (07 Oct 2018 04:51) (143/74 - 174/93)  BP(mean): --  RR: 17 (07 Oct 2018 04:51) (17 - 18)  SpO2: 94% (07 Oct 2018 04:51) (90% - 96%)    PHYSICAL EXAM:  GENERAL: NAD  HEENT: EOMI, hearing normal, conjunctiva and sclera clear  Chest: very mild exp. wheezing (improved compared to yesterday)  CV: S1S2, RRR,   GI: soft, +BS, NT/ND  Musculoskeletal: no edema  Psychiatric: affect nL, mood nL  Skin: warm and dry    LABS:                        13.1   29.48 )-----------( 429      ( 07 Oct 2018 08:20 )             39.0     10-07    138  |  100  |  17  ----------------------------<  317<H>  3.7   |  30  |  0.68    Ca    9.2      07 Oct 2018 08:20  Mg     1.9     10-07    TPro  7.6  /  Alb  3.1<L>  /  TBili  0.3  /  DBili  x   /  AST  15  /  ALT  27  /  AlkPhos  129<H>  10-05      Urinalysis Basic - ( 05 Oct 2018 17:40 )    Color: Yellow / Appearance: Clear / S.005 / pH: x  Gluc: x / Ketone: Negative  / Bili: Negative / Urobili: Negative   Blood: x / Protein: 25 mg/dL / Nitrite: Negative   Leuk Esterase: Negative / RBC: 0-2 /HPF / WBC 0-2   Sq Epi: x / Non Sq Epi: Few / Bacteria: Negative        Assessment and Plan:  -Sepsis 2/2 entero/rhinovirus and possible superimposed bacterial pneumonia:  Blood cx NGTD.  Continue Levaquin 500mg IV daily.  Pulmonary f/u  -Asthma:  Improving.  Continue Prednisone 20mg PO daily, Symbicort inh BID, and Duoneb tx Q6h  -Lactic acidemia:  improved.  Pt. reports feeling better.  No longer trending  -Type 2 DM:  Change Lantus to 46 units SQ QHS and humalog sliding scale  -HTN: continue Coreg 6.25mg PO Q12h, Cardizem CD 180mg PO daily, and lisinopril 40mg PO daily  -GERD:  continue protonix and pecid  -VTE ppx: Lovenox 40mg SQ daily

## 2018-10-07 NOTE — DISCHARGE NOTE ADULT - CARE PLAN
Principal Discharge DX:	Lower resp. tract infection  Goal:	complete course of antibiotics  Assessment and plan of treatment:	Follow up with your PMD in 1 week.  Activity as tolerable.  Low salt/limited carbohydrate diet  Secondary Diagnosis:	Asthma  Goal:	complete course of prednisone  Assessment and plan of treatment:	Follow up with your pulmonologist in 2 weeks.  Secondary Diagnosis:	Diabetes  Goal:	Take your lantus at higher recommended dose along with your other oral hypoglycemic medications.  Assessment and plan of treatment:	Follow up with your PMD in 1 week.

## 2018-10-07 NOTE — DISCHARGE NOTE ADULT - HOSPITAL COURSE
62 y/o F with hx of asthma, DM, HTN, GERD presents with c/o productive cough x 5 days. Pt states that cough is productive of green phlegm, now turning clear with runny nose, mild headache and nausea but denies vomiting. Admits to possible fever for the past few days, denies taking temperature at home. Pt states that she has cough is worse at night when lying down. States that she has been using her ventolin inhaler daily with mild temporary relief. Reports at her therapists office many people were coughing. Pt denies chills, , SOB, CP, palpations, v/d, recent travel, chest pain, abdominal pain,  myalgias, hemoptysis, recent plane travel/immobilization, hx of admissions/intubations for asthma or other symptoms.    In the ED, patient was hypertensive (170/87), stating 94% on RA otherwise hemodynamically stable. Labs significant for leukocytosis (19.61), lactate acidemia (2.6, 2.2), hyperglycemia (283), mild hypoalbuminemia (3.1). UA with proteinuria (25) and glycosuria (300). RVP positive for entero/rhinovirus. CXR pending official read, prelim read appears to have RLL consolidation. EKG NSR 90. Pt received Levaquin 500mg IV, 2L NS, Duoneb and solumedrol 125mg IV.     Pt. was admitted with pulmonary consultation.  Continued on IV antibiotic and steroids.  Lactate trended down with IV aggressive IV hydration.  Blood cx have shown no growth.  Clinically patient improved with improved respiratory status.  Maintaining SPO2 on room air.      On day of discharge patient is feeling better.  Afebrile and hemodynamically stable.      Completion of discharge in 35 minutes.

## 2018-10-07 NOTE — PROVIDER CONTACT NOTE (OTHER) - ACTION/TREATMENT ORDERED:
Per MD he will discharge pt this afternoon . Needs O2 saturation no other new orders at this time
Give insulin as ordered following sliding scale and long acting. No need to recheck Blood sugar s/p. Continue to monitor BP

## 2018-10-07 NOTE — DISCHARGE NOTE ADULT - MEDICATION SUMMARY - MEDICATIONS TO TAKE
I will START or STAY ON the medications listed below when I get home from the hospital:    predniSONE 20 mg oral tablet  -- 1 tab(s) by mouth once a day  -- Indication: For Asthma    Aspir 81 81 mg oral delayed release tablet  -- 1 tab(s) by mouth once a day  -- Indication: For Primary cardiac prevention    ramipril 10 mg oral capsule  -- 1 cap(s) by mouth 2 times a day  -- Indication: For HTN (hypertension)    dilTIAZem 180 mg/24 hours oral capsule, extended release  -- 1 cap(s) by mouth once a day  -- Indication: For HTN (hypertension)    gabapentin 100 mg oral capsule  -- 1 tab(s) by mouth once a day (at bedtime)  -- Indication: For Asthma    Lantus 100 units/mL subcutaneous solution  -- 46 unit(s) subcutaneous once a day (at bedtime)  -- Indication: For Diabetes    Bydureon Kit 2 mg subcutaneous injection, extended release  -- 1 dose(s) subcutaneous once a week  -- Indication: For Diabetes    metformin 1000 mg oral tablet  -- 1 tab(s) by mouth 2 times a day  -- Indication: For Diabetes    glipiZIDE 10 mg oral tablet  -- 1 tab(s) by mouth once a day  -- Indication: For Diabetes    carvedilol 10 mg oral capsule, extended release  -- 1 cap(s) by mouth 2 times a day  -- Indication: For HTN (hypertension)    Symbicort 80 mcg-4.5 mcg/inh inhalation aerosol  -- 2 puff(s) inhaled 2 times a day  -- Indication: For Asthma    Proventil HFA 90 mcg/inh inhalation aerosol  -- 2 puff(s) inhaled 4 times a day, As Needed  -- Indication: For Asthma    Tudorza Pressair 400 mcg/inh inhalation powder  -- 1 puff(s) inhaled 2 times a day  -- Indication: For Asthma    guaiFENesin 100 mg/5 mL oral liquid  -- 10 milliliter(s) by mouth every 6 hours, As needed, Cough  -- Indication: For Lower resp. tract infection    lansoprazole 30 mg oral delayed release capsule  -- 1 cap(s) by mouth once a day  -- Indication: For Gastroesophageal reflux disease without esophagitis    levoFLOXacin 500 mg oral tablet  -- 1 tab(s) by mouth every 24 hours  -- Indication: For Lower resp. tract infection    multivitamin  -- 1 tab(s) by mouth once a day  -- Indication: For Supplement    Vitamin D2 50,000 intl units (1.25 mg) oral capsule  -- 1 cap(s) by mouth once a week  -- Indication: For Supplement

## 2018-10-07 NOTE — PROVIDER CONTACT NOTE (OTHER) - RECOMMENDATIONS
Pt and spouse educated on importance of removing medications from room . Pt stated she wants to discharged

## 2018-10-07 NOTE — DISCHARGE NOTE ADULT - MEDICATION SUMMARY - MEDICATIONS TO CHANGE
I will SWITCH the dose or number of times a day I take the medications listed below when I get home from the hospital:    Lantus 100 units/mL subcutaneous solution  -- 38 unit(s) subcutaneous once a day (at bedtime)

## 2018-10-07 NOTE — PROGRESS NOTE ADULT - PROBLEM SELECTOR PLAN 1
poss Lower resp tract infection  obstructive airway disease  cxr reviewed, post surgical scarring  URI on RVP  cont NEBS  will taper Steroids, monitor FS  on emp ABX  biomarkers pending  oral hygiene  skin care  monitor vs and HD and Sat  increase activity  assess sat on exertion  will follow

## 2018-10-07 NOTE — DISCHARGE NOTE ADULT - PATIENT PORTAL LINK FT
You can access the [facility] Patient Portal, offered by [facility], by registering with the following website: [email or web address]

## 2018-10-08 LAB — LEGIONELLA AG UR QL: NEGATIVE — SIGNIFICANT CHANGE UP

## 2018-10-09 LAB — S PNEUM AG UR QL: NEGATIVE — SIGNIFICANT CHANGE UP

## 2018-10-10 LAB
CULTURE RESULTS: SIGNIFICANT CHANGE UP
CULTURE RESULTS: SIGNIFICANT CHANGE UP
SPECIMEN SOURCE: SIGNIFICANT CHANGE UP
SPECIMEN SOURCE: SIGNIFICANT CHANGE UP

## 2018-11-11 PROCEDURE — 81001 URINALYSIS AUTO W/SCOPE: CPT

## 2018-11-11 PROCEDURE — 93005 ELECTROCARDIOGRAM TRACING: CPT

## 2018-11-11 PROCEDURE — 36415 COLL VENOUS BLD VENIPUNCTURE: CPT

## 2018-11-11 PROCEDURE — 80053 COMPREHEN METABOLIC PANEL: CPT

## 2018-11-11 PROCEDURE — 71046 X-RAY EXAM CHEST 2 VIEWS: CPT

## 2018-11-11 PROCEDURE — 83605 ASSAY OF LACTIC ACID: CPT

## 2018-11-11 PROCEDURE — 96365 THER/PROPH/DIAG IV INF INIT: CPT

## 2018-11-11 PROCEDURE — 96375 TX/PRO/DX INJ NEW DRUG ADDON: CPT

## 2018-11-11 PROCEDURE — 87633 RESP VIRUS 12-25 TARGETS: CPT

## 2018-11-11 PROCEDURE — 87581 M.PNEUMON DNA AMP PROBE: CPT

## 2018-11-11 PROCEDURE — 82962 GLUCOSE BLOOD TEST: CPT

## 2018-11-11 PROCEDURE — 80048 BASIC METABOLIC PNL TOTAL CA: CPT

## 2018-11-11 PROCEDURE — 87486 CHLMYD PNEUM DNA AMP PROBE: CPT

## 2018-11-11 PROCEDURE — 87899 AGENT NOS ASSAY W/OPTIC: CPT

## 2018-11-11 PROCEDURE — 85027 COMPLETE CBC AUTOMATED: CPT

## 2018-11-11 PROCEDURE — 87798 DETECT AGENT NOS DNA AMP: CPT

## 2018-11-11 PROCEDURE — 96372 THER/PROPH/DIAG INJ SC/IM: CPT | Mod: XU

## 2018-11-11 PROCEDURE — 87040 BLOOD CULTURE FOR BACTERIA: CPT

## 2018-11-11 PROCEDURE — 96366 THER/PROPH/DIAG IV INF ADDON: CPT

## 2018-11-11 PROCEDURE — 96376 TX/PRO/DX INJ SAME DRUG ADON: CPT

## 2018-11-11 PROCEDURE — 83036 HEMOGLOBIN GLYCOSYLATED A1C: CPT

## 2018-11-11 PROCEDURE — 94640 AIRWAY INHALATION TREATMENT: CPT

## 2018-11-11 PROCEDURE — 87449 NOS EACH ORGANISM AG IA: CPT

## 2018-11-11 PROCEDURE — 83735 ASSAY OF MAGNESIUM: CPT

## 2018-11-11 PROCEDURE — 99285 EMERGENCY DEPT VISIT HI MDM: CPT | Mod: 25

## 2019-05-10 ENCOUNTER — EMERGENCY (EMERGENCY)
Facility: HOSPITAL | Age: 64
LOS: 1 days | Discharge: HOME | End: 2019-05-10
Attending: EMERGENCY MEDICINE | Admitting: EMERGENCY MEDICINE
Payer: COMMERCIAL

## 2019-05-10 VITALS
HEART RATE: 90 BPM | SYSTOLIC BLOOD PRESSURE: 152 MMHG | DIASTOLIC BLOOD PRESSURE: 61 MMHG | WEIGHT: 146.83 LBS | HEIGHT: 62 IN | RESPIRATION RATE: 18 BRPM | TEMPERATURE: 99 F | OXYGEN SATURATION: 94 %

## 2019-05-10 VITALS
RESPIRATION RATE: 18 BRPM | OXYGEN SATURATION: 95 % | TEMPERATURE: 99 F | HEART RATE: 90 BPM | SYSTOLIC BLOOD PRESSURE: 134 MMHG | DIASTOLIC BLOOD PRESSURE: 76 MMHG

## 2019-05-10 DIAGNOSIS — Z88.0 ALLERGY STATUS TO PENICILLIN: ICD-10-CM

## 2019-05-10 DIAGNOSIS — Z79.82 LONG TERM (CURRENT) USE OF ASPIRIN: ICD-10-CM

## 2019-05-10 DIAGNOSIS — R05 COUGH: ICD-10-CM

## 2019-05-10 DIAGNOSIS — Z79.52 LONG TERM (CURRENT) USE OF SYSTEMIC STEROIDS: ICD-10-CM

## 2019-05-10 DIAGNOSIS — Z90.49 ACQUIRED ABSENCE OF OTHER SPECIFIED PARTS OF DIGESTIVE TRACT: Chronic | ICD-10-CM

## 2019-05-10 DIAGNOSIS — Z79.2 LONG TERM (CURRENT) USE OF ANTIBIOTICS: ICD-10-CM

## 2019-05-10 DIAGNOSIS — Z79.4 LONG TERM (CURRENT) USE OF INSULIN: ICD-10-CM

## 2019-05-10 DIAGNOSIS — I10 ESSENTIAL (PRIMARY) HYPERTENSION: ICD-10-CM

## 2019-05-10 DIAGNOSIS — Z98.42 CATARACT EXTRACTION STATUS, LEFT EYE: Chronic | ICD-10-CM

## 2019-05-10 DIAGNOSIS — Z98.89 OTHER SPECIFIED POSTPROCEDURAL STATES: Chronic | ICD-10-CM

## 2019-05-10 DIAGNOSIS — E11.9 TYPE 2 DIABETES MELLITUS WITHOUT COMPLICATIONS: ICD-10-CM

## 2019-05-10 DIAGNOSIS — Z79.899 OTHER LONG TERM (CURRENT) DRUG THERAPY: ICD-10-CM

## 2019-05-10 DIAGNOSIS — J45.909 UNSPECIFIED ASTHMA, UNCOMPLICATED: ICD-10-CM

## 2019-05-10 DIAGNOSIS — J20.9 ACUTE BRONCHITIS, UNSPECIFIED: ICD-10-CM

## 2019-05-10 LAB
ALBUMIN SERPL ELPH-MCNC: 3.4 G/DL — SIGNIFICANT CHANGE UP (ref 3.3–5)
ALP SERPL-CCNC: 89 U/L — SIGNIFICANT CHANGE UP (ref 40–120)
ALT FLD-CCNC: 21 U/L — SIGNIFICANT CHANGE UP (ref 10–45)
ANION GAP SERPL CALC-SCNC: 9 MMOL/L — SIGNIFICANT CHANGE UP (ref 5–17)
AST SERPL-CCNC: 22 U/L — SIGNIFICANT CHANGE UP (ref 10–40)
BASOPHILS # BLD AUTO: 0 K/UL — SIGNIFICANT CHANGE UP (ref 0–0.2)
BASOPHILS NFR BLD AUTO: 0 % — SIGNIFICANT CHANGE UP (ref 0–2)
BILIRUB SERPL-MCNC: 0.4 MG/DL — SIGNIFICANT CHANGE UP (ref 0.2–1.2)
BUN SERPL-MCNC: 13 MG/DL — SIGNIFICANT CHANGE UP (ref 7–23)
CALCIUM SERPL-MCNC: 9.1 MG/DL — SIGNIFICANT CHANGE UP (ref 8.4–10.5)
CHLORIDE SERPL-SCNC: 98 MMOL/L — SIGNIFICANT CHANGE UP (ref 96–108)
CO2 SERPL-SCNC: 29 MMOL/L — SIGNIFICANT CHANGE UP (ref 22–31)
CREAT SERPL-MCNC: 0.81 MG/DL — SIGNIFICANT CHANGE UP (ref 0.5–1.3)
EOSINOPHIL # BLD AUTO: 0 K/UL — SIGNIFICANT CHANGE UP (ref 0–0.5)
EOSINOPHIL NFR BLD AUTO: 0 % — SIGNIFICANT CHANGE UP (ref 0–6)
GLUCOSE SERPL-MCNC: 246 MG/DL — HIGH (ref 70–99)
HCT VFR BLD CALC: 37.4 % — SIGNIFICANT CHANGE UP (ref 34.5–45)
HGB BLD-MCNC: 12.3 G/DL — SIGNIFICANT CHANGE UP (ref 11.5–15.5)
LYMPHOCYTES # BLD AUTO: 17.5 % — SIGNIFICANT CHANGE UP (ref 13–44)
LYMPHOCYTES # BLD AUTO: 3.38 K/UL — HIGH (ref 1–3.3)
MCHC RBC-ENTMCNC: 27.8 PG — SIGNIFICANT CHANGE UP (ref 27–34)
MCHC RBC-ENTMCNC: 32.9 GM/DL — SIGNIFICANT CHANGE UP (ref 32–36)
MCV RBC AUTO: 84.4 FL — SIGNIFICANT CHANGE UP (ref 80–100)
MONOCYTES # BLD AUTO: 0.85 K/UL — SIGNIFICANT CHANGE UP (ref 0–0.9)
MONOCYTES NFR BLD AUTO: 4.4 % — SIGNIFICANT CHANGE UP (ref 2–14)
NEUTROPHILS # BLD AUTO: 14.56 K/UL — HIGH (ref 1.8–7.4)
NEUTROPHILS NFR BLD AUTO: 71.1 % — SIGNIFICANT CHANGE UP (ref 43–77)
NT-PROBNP SERPL-SCNC: 324 PG/ML — HIGH (ref 0–300)
PLATELET # BLD AUTO: 317 K/UL — SIGNIFICANT CHANGE UP (ref 150–400)
POTASSIUM SERPL-MCNC: 3.4 MMOL/L — LOW (ref 3.5–5.3)
POTASSIUM SERPL-SCNC: 3.4 MMOL/L — LOW (ref 3.5–5.3)
PROT SERPL-MCNC: 6.6 G/DL — SIGNIFICANT CHANGE UP (ref 6–8.3)
RBC # BLD: 4.43 M/UL — SIGNIFICANT CHANGE UP (ref 3.8–5.2)
RBC # FLD: 13.9 % — SIGNIFICANT CHANGE UP (ref 10.3–14.5)
SODIUM SERPL-SCNC: 136 MMOL/L — SIGNIFICANT CHANGE UP (ref 135–145)
WBC # BLD: 19.29 K/UL — HIGH (ref 3.8–10.5)
WBC # FLD AUTO: 19.29 K/UL — HIGH (ref 3.8–10.5)

## 2019-05-10 PROCEDURE — 94640 AIRWAY INHALATION TREATMENT: CPT

## 2019-05-10 PROCEDURE — 99285 EMERGENCY DEPT VISIT HI MDM: CPT

## 2019-05-10 PROCEDURE — 71046 X-RAY EXAM CHEST 2 VIEWS: CPT | Mod: 26

## 2019-05-10 PROCEDURE — 85025 COMPLETE CBC W/AUTO DIFF WBC: CPT

## 2019-05-10 PROCEDURE — 99284 EMERGENCY DEPT VISIT MOD MDM: CPT | Mod: 25

## 2019-05-10 PROCEDURE — 83880 ASSAY OF NATRIURETIC PEPTIDE: CPT

## 2019-05-10 PROCEDURE — 36415 COLL VENOUS BLD VENIPUNCTURE: CPT

## 2019-05-10 PROCEDURE — 80053 COMPREHEN METABOLIC PANEL: CPT

## 2019-05-10 PROCEDURE — 71046 X-RAY EXAM CHEST 2 VIEWS: CPT

## 2019-05-10 RX ORDER — EPINEPHRINE 0.3 MG/.3ML
3 INJECTION INTRAMUSCULAR; SUBCUTANEOUS
Qty: 90 | Refills: 0
Start: 2019-05-10 | End: 2019-06-08

## 2019-05-10 RX ORDER — AZITHROMYCIN 500 MG/1
500 TABLET, FILM COATED ORAL ONCE
Refills: 0 | Status: COMPLETED | OUTPATIENT
Start: 2019-05-10 | End: 2019-05-10

## 2019-05-10 RX ORDER — ALBUTEROL 90 UG/1
2 AEROSOL, METERED ORAL
Qty: 1 | Refills: 1
Start: 2019-05-10 | End: 2019-07-08

## 2019-05-10 RX ORDER — AZITHROMYCIN 500 MG/1
1 TABLET, FILM COATED ORAL
Qty: 6 | Refills: 0
Start: 2019-05-10 | End: 2019-05-14

## 2019-05-10 RX ORDER — IPRATROPIUM/ALBUTEROL SULFATE 18-103MCG
3 AEROSOL WITH ADAPTER (GRAM) INHALATION ONCE
Refills: 0 | Status: COMPLETED | OUTPATIENT
Start: 2019-05-10 | End: 2019-05-10

## 2019-05-10 RX ADMIN — AZITHROMYCIN 500 MILLIGRAM(S): 500 TABLET, FILM COATED ORAL at 21:50

## 2019-05-10 RX ADMIN — Medication 3 MILLILITER(S): at 20:03

## 2019-05-10 NOTE — ED ADULT TRIAGE NOTE - CHIEF COMPLAINT QUOTE
Pt CO Cough x3 days.  Mask applied in triage.  EKG in progress.  PT denies N/V/D, SOB ,Fevers, CP and Dizziness.

## 2019-05-10 NOTE — ED ADULT NURSE NOTE - OBJECTIVE STATEMENT
Patient presents to the ED with cough for a few days "I have asthma." Patient states she has a nebulizer machine but does not have the medicine to go in it.  Denies fevers, chills.  States cough is non-productive.  AA&OX3, speaking in complete sentences, appears comfortable.  Non-diaphoretic, no pallor.

## 2019-05-10 NOTE — ED PROVIDER NOTE - ATTENDING CONTRIBUTION TO CARE
62 yo female with h/o DM, HTN, asthma, in the ER c/o heavy cough for the past 3 days, cant sleep, sweating. no albuterol at home. denies fevers. VSS, +rales on bilateral bases, no wheezes. will give neb, check cxr to r/o pna, volume overload, will send trop/bnp to screen for BNP. anticipate dc home if w/u neg

## 2019-05-10 NOTE — ED PROVIDER NOTE - NSFOLLOWUPINSTRUCTIONS_ED_ALL_ED_FT
I have discussed the discharge plan with the patient. The patient agrees with the plan, as discussed.  The patient understands Emergency Department diagnosis is a preliminary diagnosis often based on limited information and that the patient must adhere to the follow-up plan as discussed.  The patient understands that if the symptoms worsen or if prescribed medications do not have the desired/planned effect that the patient may return to the Emergency Department at any time for further evaluation and treatment. I have discussed the discharge plan with the patient. The patient agrees with the plan, as discussed.  The patient understands Emergency Department diagnosis is a preliminary diagnosis often based on limited information and that the patient must adhere to the follow-up plan as discussed.  The patient understands that if the symptoms worsen or if prescribed medications do not have the desired/planned effect that the patient may return to the Emergency Department at any time for further evaluation and treatment.      Asthma Attack  Image   Acute bronchospasm caused by asthma is also referred to as an asthma attack. Bronchospasm means that the air passages become narrowed or "tight," which limits the amount of oxygen that can get into the lungs. The narrowing is caused by inflammation and tightening of the muscles in the air tubes (bronchi) in the lungs. Excessive mucus is also produced, which narrows the airways more. This can cause trouble breathing, coughing, and loud breathing (wheezing).    What are the causes?  Possible triggers include:  Animal dander from the skin, hair, or feathers of animals.  Dust mites contained in house dust.  Cockroaches.  Pollen from trees or grass.  Mold.  Cigarette or tobacco smoke.  Air pollutants such as dust, household , hair sprays, aerosol sprays, paint fumes, strong chemicals, or strong odors.  Cold air or weather changes. Cold air may trigger inflammation. Winds increase molds and pollens in the air.  Strong emotions such as crying or laughing hard.  Stress.  Certain medicines, such as aspirin or beta-blockers.  Sulfites in foods and drinks, such as dried fruits and wine.  Infections or inflammatory conditions, such as a flu, a cold, pneumonia, or inflammation of the nasal membranes (rhinitis).  Gastroesophageal reflux disease (GERD). GERD is a condition in which stomach acid backs up into your esophagus, which can irritate nearby airway structures.  Exercise or activity that requires a lot of energy.  What are the signs or symptoms?  Symptoms of this condition include:  Wheezing. This may sound like whistling while breathing. This may be more noticeable at night.  Excessive coughing, particularly at night.  Chest tightness or pain.  Shortness of breath.  Feeling like you cannot get enough air no matter how hard you try (air hunger).  How is this diagnosed?  This condition may be diagnosed based on:  Your medical history.  Your symptoms.  A physical exam.  Tests to check for other causes of your symptoms or other conditions that may have triggered your asthma attack. These tests may include:  Chest X-ray.  Blood tests.  Specialized tests to assess lung function, such as breathing into a device that measures how much air you inhale and exhale (spirometry).  How is this treated?  The goal of treatment is to open the airways in your lungs and reduce inflammation. Most asthma attacks are treated with medicines that you inhale through a hand-held inhaler (metered dose inhaler, MDI) or a device that turns liquid medicine into a mist that you inhale (nebulizer). Medicines may include:  Quick relief or rescue medicines that relax the muscles of the bronchi. These medicines include bronchodilators, such as albuterol.  Controller medicines, such as inhaled corticosteroids. These are long-acting medicines that are used for daily asthma maintenance.  If you have a moderate or severe asthma attack, you may be treated with steroid medicines by mouth or through an IV injection at the hospital. Steroid medicines reduce inflammation in your lungs. Depending on the severity of your attack, you may need oxygen therapy to help you breathe.    If your asthma attack was caused by a bacterial infection, such as pneumonia, you will be given antibiotic medicines.    Follow these instructions at home:  Medicines     Take over-the-counter and prescription medicines only as told by your health care provider. Keep your medicines up-to-date and available.  If you are more than 24 weeks pregnant and you are prescribed any new medicines, tell your obstetrician about those medicines.  If you were prescribed an antibiotic medicine, take it as told by your health care provider. Do not stop taking the antibiotic even if you start to feel better.  Avoiding triggers     Image   Keep track of things that trigger your asthma attacks or cause you to have breathing problems, and avoid exposure to these triggers.  Do not use any products that contain nicotine or tobacco, such as cigarettes and e-cigarettes. If you need help quitting, ask your health care provider.  Avoid secondhand smoke.  Avoid strong smells, such as perfumes, aerosols, and cleaning solvents.  When pollen or air pollution is bad, keep windows closed and use an air conditioner or go to places with air conditioning.  Asthma action plan     Work with your health care provider to make a written plan for managing and treating your asthma attacks (asthma action plan). This plan should include:  A list of your asthma triggers and how to avoid them.  Information about when your medicines should be taken and when their dosage should be changed.  Instructions about using a device called a peak flow meter to monitor your condition. A peak flow meter measures how well your lungs are working and measures how severe your asthma is at a given time. Your "personal best" is the highest peak flow rate you can reach when you feel good and have no asthma symptoms.  General instructions     Avoid excessive exercise or activity until your asthma attack resolves. Ask your health care provider what activities are safe for you and when you can return to your normal activities.  Stay up to date on all vaccinations recommended by your health care provider, such as flu and pneumonia vaccines.  Drink enough fluid to keep your urine clear or pale yellow. Staying hydrated helps keep mucus in your lungs thin so it can be coughed up easily.  If you drink caffeine, do so in moderation.  Do not use alcohol until you have recovered.  Keep all follow-up visits as told by your health care provider. This is important. Asthma requires careful medical care, and you and your health care provider can work together to reduce the likelihood of future attacks.  Contact a health care provider if:  Your peak flow reading is still at 50–79% of your personal best after you have followed your action plan for 1 hour. This is in the yellow zone, which means "caution."  You need to use a reliever medicine more than 2–3 times a week.  Your medicines are causing side effects, such as:  Rash.  Itching.  Swelling.  Trouble breathing.  Your symptoms do not improve after 48 hours.  You cough up mucus (sputum) that is thicker than usual.  You have a fever.  You need to use your medicines much more frequently than normal.  Get help right away if:  Your peak flow reading is less than 50% of your personal best. This is in the red zone, which means "danger."  You have severe trouble breathing.  You develop chest pain or discomfort.  Your medicines no longer seem to be helping.  You vomit.  You cannot eat or drink without vomiting.  You are coughing up yellow, green, brown, or bloody mucus.  You have a fever and your symptoms suddenly get worse.  You have trouble swallowing.  You feel very tired, and breathing becomes tiring.  Summary  Acute bronchospasm caused by asthma is also referred to as an asthma attack.   Bronchospasm is caused by narrowing or tightness in air passages, which causes shortness of breath, coughing, and loud breathing (wheezing).  Many things can trigger an asthma attack, such as allergens, weather changes, exercise, smoke, and other fumes.  Treatment for an asthma attack may include inhaled rescue medicines for immediate relief, as well as the use of maintenance therapy.  Get help right away if you have worsening shortness of breath, chest pain, or fever, or if your home medicines are no longer helping with your symptoms.  This information is not intended to replace advice given to you by your health care provider. Make sure you discuss any questions you have with your health care provider.      Log Out.    [employer] CareNotes®     :  [facility]             PNEUMONIA - General Information     Pneumonia    WHAT YOU NEED TO KNOW:    What is pneumonia? Pneumonia is an infection in your lungs caused by bacteria, viruses, fungi, or parasites. You can become infected if you come in contact with someone who is sick. You can get pneumonia if you recently had surgery or needed a ventilator to help you breathe. Pneumonia can also be caused by accidentally inhaling saliva or small pieces of food. Pneumonia may cause mild symptoms, or it can be severe and life-threatening. The Lungs         What increases my risk for pneumonia?     A cold or the flu      Health conditions, such as heart or lung disease      A weakened immune system caused by HIV, cancer, or steroid use      Recent hospitalization      Smoking      Excess alcohol use      Older age    What are the signs and symptoms of pneumonia?     Fever or chills      Cough      Shortness of breath or rapid breathing      Chest pain when you cough or breathe deeply      Headache      Vomiting      Fatigue or confusion    How is pneumonia diagnosed? Your healthcare provider will listen to your lungs. Tell him or her if you have other health conditions. Give your provider a complete list of all medicines you have taken recently. You may need any of the following:     Blood tests may show signs of an infection or the bacteria causing your pneumonia. Blood tests can also show how much oxygen is in your blood.       A chest x-ray may show signs of infection in your lungs.       Pulse oximetry measures the amount of oxygen in your blood.       A mucus sample is collected and tested for the germ that is causing your illness. It can help your healthcare provider choose the best medicine to treat the infection.     How is pneumonia treated?     Antibiotics treat pneumonia caused by bacteria.      Acetaminophen decreases pain and fever. It is available without a doctor's order. Ask how much to take and how often to take it. Follow directions. Read the labels of all other medicines you are using to see if they also contain acetaminophen, or ask your doctor or pharmacist. Acetaminophen can cause liver damage if not taken correctly. Do not use more than 4 grams (4,000 milligrams) total of acetaminophen in one day.       NSAIDs, such as ibuprofen, help decrease swelling, pain, and fever. This medicine is available with or without a doctor's order. NSAIDs can cause stomach bleeding or kidney problems in certain people. If you take blood thinner medicine, always ask your healthcare provider if NSAIDs are safe for you. Always read the medicine label and follow directions.      Airway clearance techniques are exercises to help remove mucus so you can breathe more easily. Your healthcare provider will show you how to do the exercises. These exercises may be used along with machines or devices to help decrease your symptoms.       Respiratory support is given to help you breathe. You may receive oxygen to increase the level of oxygen in your blood. You may also need a machine to help you breathe.     How can I manage my symptoms?     Rest as needed. Rest often while you recover. Slowly start to do more each day.      Drink liquids as directed. Ask how much liquid to drink each day and which liquids are best for you. Liquids help thin your mucus, which may make it easier for you to cough it up.       Do not smoke. Avoid secondhand smoke. Smoking increases your risk for pneumonia. Smoking also makes it harder for you to get better after you have had pneumonia. Ask your healthcare provider for information if you currently smoke and need help to quit. E-cigarettes or smokeless tobacco still contain nicotine. Talk to your healthcare provider before you use these products.       Use a cool mist humidifier. A humidifier will help increase air moisture in your home. This may make it easier for you to breathe and help decrease your cough.       Keep your head elevated. You may be able to breathe better if you lie down with the head of your bed up.     How can I prevent pneumonia?     Prevent the spread of germs. Wash your hands often with soap and water. Use gel hand cleanser when there is no soap and water available. Do not touch your eyes, nose, or mouth unless you have washed your hands first. Cover your mouth when you cough. Cough into a tissue or your shirtsleeve so you do not spread germs from your hands. If you are sick, stay away from others as much as possible. Handwashing           Limit alcohol. Women should limit alcohol to 1 drink a day. Men should limit alcohol to 2 drinks a day. A drink of alcohol is 12 ounces of beer, 5 ounces of wine, or 1½ ounces of liquor.       Ask about vaccines. You may need a vaccine to help prevent pneumonia. Get an influenza (flu) vaccine every year as soon as it becomes available.     When should I seek immediate care?     You cough up blood.       Your heart beats more than 100 beats in 1 minute.       You are very tired, confused, and cannot think clearly.      You have chest pain or trouble breathing.       Your lips or fingernails turn gray or blue.     When should I contact my healthcare provider?     Your symptoms are the same or get worse 48 hours after you start antibiotics.      Your fever is not below 99°F (37.2°C) 48 hours after you start antibiotics.       You have a fever higher than 101°F (38.3°C).       You cannot eat, or you have loss of appetite, nausea, or are vomiting.      You have questions or concerns about your condition or care.    CARE AGREEMENT:    You have the right to help plan your care. Learn about your health condition and how it may be treated. Discuss treatment options with your healthcare providers to decide what care you want to receive. You always have the right to refuse treatment.

## 2019-05-10 NOTE — ED PROVIDER NOTE - CLINICAL SUMMARY MEDICAL DECISION MAKING FREE TEXT BOX
62 yo female with h/o DM, HTN, asthma, in the ER c/o persistent cough x 3 days, c/o chest soreness, night seats and generalized weakness, Pt denies fever oir chills, denies hemoptysis. Pt appears in NAD, VSS. received nebulizer treatment and reports improvement. CXR done to r/o pneumonia. No definitive infiltrates or consolidations. considering that pt  reports might seats, has leukocytosis and some rales on exam, will start on Z-pack . pt seeking discharge home and states that she will f/u with her PMD. returned precautions discussed.

## 2019-05-11 RX ORDER — ALBUTEROL 90 UG/1
2 AEROSOL, METERED ORAL
Qty: 1 | Refills: 1
Start: 2019-05-11 | End: 2019-07-09

## 2019-05-11 RX ORDER — AZITHROMYCIN 500 MG/1
1 TABLET, FILM COATED ORAL
Qty: 6 | Refills: 0
Start: 2019-05-11 | End: 2019-05-15

## 2019-05-11 RX ORDER — EPINEPHRINE 0.3 MG/.3ML
3 INJECTION INTRAMUSCULAR; SUBCUTANEOUS
Qty: 90 | Refills: 0
Start: 2019-05-11 | End: 2019-06-09

## 2019-07-24 NOTE — ED ADULT NURSE NOTE - PMH
Asthma    Diabetes    Gastroesophageal reflux disease without esophagitis    HTN (hypertension) TOP X5

## 2022-01-19 ENCOUNTER — INPATIENT (INPATIENT)
Facility: HOSPITAL | Age: 67
LOS: 1 days | Discharge: HOME | DRG: 178 | End: 2022-01-21
Attending: INTERNAL MEDICINE | Admitting: INTERNAL MEDICINE
Payer: COMMERCIAL

## 2022-01-19 VITALS
HEIGHT: 62 IN | RESPIRATION RATE: 15 BRPM | TEMPERATURE: 97 F | SYSTOLIC BLOOD PRESSURE: 133 MMHG | DIASTOLIC BLOOD PRESSURE: 70 MMHG | WEIGHT: 139.99 LBS | OXYGEN SATURATION: 95 % | HEART RATE: 91 BPM

## 2022-01-19 DIAGNOSIS — Z90.49 ACQUIRED ABSENCE OF OTHER SPECIFIED PARTS OF DIGESTIVE TRACT: Chronic | ICD-10-CM

## 2022-01-19 DIAGNOSIS — E11.9 TYPE 2 DIABETES MELLITUS WITHOUT COMPLICATIONS: ICD-10-CM

## 2022-01-19 DIAGNOSIS — I63.9 CEREBRAL INFARCTION, UNSPECIFIED: ICD-10-CM

## 2022-01-19 DIAGNOSIS — L98.429 NON-PRESSURE CHRONIC ULCER OF BACK WITH UNSPECIFIED SEVERITY: ICD-10-CM

## 2022-01-19 DIAGNOSIS — Z29.9 ENCOUNTER FOR PROPHYLACTIC MEASURES, UNSPECIFIED: ICD-10-CM

## 2022-01-19 DIAGNOSIS — F32.9 MAJOR DEPRESSIVE DISORDER, SINGLE EPISODE, UNSPECIFIED: ICD-10-CM

## 2022-01-19 DIAGNOSIS — R53.1 WEAKNESS: ICD-10-CM

## 2022-01-19 DIAGNOSIS — K21.9 GASTRO-ESOPHAGEAL REFLUX DISEASE WITHOUT ESOPHAGITIS: ICD-10-CM

## 2022-01-19 DIAGNOSIS — J45.909 UNSPECIFIED ASTHMA, UNCOMPLICATED: ICD-10-CM

## 2022-01-19 DIAGNOSIS — Z71.89 OTHER SPECIFIED COUNSELING: ICD-10-CM

## 2022-01-19 DIAGNOSIS — Z98.42 CATARACT EXTRACTION STATUS, LEFT EYE: Chronic | ICD-10-CM

## 2022-01-19 DIAGNOSIS — L89.159 PRESSURE ULCER OF SACRAL REGION, UNSPECIFIED STAGE: ICD-10-CM

## 2022-01-19 DIAGNOSIS — D72.829 ELEVATED WHITE BLOOD CELL COUNT, UNSPECIFIED: ICD-10-CM

## 2022-01-19 DIAGNOSIS — Z98.89 OTHER SPECIFIED POSTPROCEDURAL STATES: Chronic | ICD-10-CM

## 2022-01-19 DIAGNOSIS — U07.1 COVID-19: ICD-10-CM

## 2022-01-19 DIAGNOSIS — I10 ESSENTIAL (PRIMARY) HYPERTENSION: ICD-10-CM

## 2022-01-19 LAB
ALBUMIN SERPL ELPH-MCNC: 2.8 G/DL — LOW (ref 3.3–5)
ALP SERPL-CCNC: 140 U/L — HIGH (ref 40–120)
ALT FLD-CCNC: 44 U/L — SIGNIFICANT CHANGE UP (ref 12–78)
ANION GAP SERPL CALC-SCNC: 10 MMOL/L — SIGNIFICANT CHANGE UP (ref 5–17)
APPEARANCE UR: ABNORMAL
APTT BLD: 30.3 SEC — SIGNIFICANT CHANGE UP (ref 27.5–35.5)
AST SERPL-CCNC: 23 U/L — SIGNIFICANT CHANGE UP (ref 15–37)
BACTERIA # UR AUTO: ABNORMAL
BASOPHILS # BLD AUTO: 0 K/UL — SIGNIFICANT CHANGE UP (ref 0–0.2)
BASOPHILS NFR BLD AUTO: 0 % — SIGNIFICANT CHANGE UP (ref 0–2)
BILIRUB SERPL-MCNC: 0.3 MG/DL — SIGNIFICANT CHANGE UP (ref 0.2–1.2)
BILIRUB UR-MCNC: NEGATIVE — SIGNIFICANT CHANGE UP
BUN SERPL-MCNC: 19 MG/DL — SIGNIFICANT CHANGE UP (ref 7–23)
CALCIUM SERPL-MCNC: 9.4 MG/DL — SIGNIFICANT CHANGE UP (ref 8.5–10.1)
CHLORIDE SERPL-SCNC: 100 MMOL/L — SIGNIFICANT CHANGE UP (ref 96–108)
CK SERPL-CCNC: 13 U/L — LOW (ref 26–192)
CO2 SERPL-SCNC: 27 MMOL/L — SIGNIFICANT CHANGE UP (ref 22–31)
COLOR SPEC: SIGNIFICANT CHANGE UP
COMMENT - URINE: SIGNIFICANT CHANGE UP
CREAT SERPL-MCNC: 1 MG/DL — SIGNIFICANT CHANGE UP (ref 0.5–1.3)
DIFF PNL FLD: ABNORMAL
EOSINOPHIL # BLD AUTO: 0 K/UL — SIGNIFICANT CHANGE UP (ref 0–0.5)
EOSINOPHIL NFR BLD AUTO: 0 % — SIGNIFICANT CHANGE UP (ref 0–6)
EPI CELLS # UR: SIGNIFICANT CHANGE UP
FLUAV AG NPH QL: SIGNIFICANT CHANGE UP
FLUBV AG NPH QL: SIGNIFICANT CHANGE UP
GLUCOSE SERPL-MCNC: 307 MG/DL — HIGH (ref 70–99)
GLUCOSE UR QL: 1000 MG/DL
HCT VFR BLD CALC: 38.9 % — SIGNIFICANT CHANGE UP (ref 34.5–45)
HGB BLD-MCNC: 12.7 G/DL — SIGNIFICANT CHANGE UP (ref 11.5–15.5)
INR BLD: 1.17 RATIO — HIGH (ref 0.88–1.16)
KETONES UR-MCNC: NEGATIVE — SIGNIFICANT CHANGE UP
LACTATE SERPL-SCNC: 2.5 MMOL/L — HIGH (ref 0.7–2)
LACTATE SERPL-SCNC: 3.2 MMOL/L — HIGH (ref 0.7–2)
LEUKOCYTE ESTERASE UR-ACNC: ABNORMAL
LYMPHOCYTES # BLD AUTO: 10.17 K/UL — HIGH (ref 1–3.3)
LYMPHOCYTES # BLD AUTO: 54 % — HIGH (ref 13–44)
MANUAL SMEAR VERIFICATION: SIGNIFICANT CHANGE UP
MCHC RBC-ENTMCNC: 27 PG — SIGNIFICANT CHANGE UP (ref 27–34)
MCHC RBC-ENTMCNC: 32.6 GM/DL — SIGNIFICANT CHANGE UP (ref 32–36)
MCV RBC AUTO: 82.6 FL — SIGNIFICANT CHANGE UP (ref 80–100)
MONOCYTES # BLD AUTO: 0.75 K/UL — SIGNIFICANT CHANGE UP (ref 0–0.9)
MONOCYTES NFR BLD AUTO: 4 % — SIGNIFICANT CHANGE UP (ref 2–14)
NEUTROPHILS # BLD AUTO: 7.16 K/UL — SIGNIFICANT CHANGE UP (ref 1.8–7.4)
NEUTROPHILS NFR BLD AUTO: 38 % — LOW (ref 43–77)
NITRITE UR-MCNC: NEGATIVE — SIGNIFICANT CHANGE UP
NRBC # BLD: 0 — SIGNIFICANT CHANGE UP
NRBC # BLD: SIGNIFICANT CHANGE UP /100 WBCS (ref 0–0)
PH UR: 6 — SIGNIFICANT CHANGE UP (ref 5–8)
PLAT MORPH BLD: NORMAL — SIGNIFICANT CHANGE UP
PLATELET # BLD AUTO: 492 K/UL — HIGH (ref 150–400)
POTASSIUM SERPL-MCNC: 3.8 MMOL/L — SIGNIFICANT CHANGE UP (ref 3.5–5.3)
POTASSIUM SERPL-SCNC: 3.8 MMOL/L — SIGNIFICANT CHANGE UP (ref 3.5–5.3)
PROT SERPL-MCNC: 7.4 G/DL — SIGNIFICANT CHANGE UP (ref 6–8.3)
PROT UR-MCNC: 15
PROTHROM AB SERPL-ACNC: 13.6 SEC — SIGNIFICANT CHANGE UP (ref 10.6–13.6)
RBC # BLD: 4.71 M/UL — SIGNIFICANT CHANGE UP (ref 3.8–5.2)
RBC # FLD: 13.9 % — SIGNIFICANT CHANGE UP (ref 10.3–14.5)
RBC BLD AUTO: NORMAL — SIGNIFICANT CHANGE UP
RBC CASTS # UR COMP ASSIST: SIGNIFICANT CHANGE UP /HPF (ref 0–4)
RSV RNA NPH QL NAA+NON-PROBE: SIGNIFICANT CHANGE UP
SARS-COV-2 RNA SPEC QL NAA+PROBE: DETECTED
SODIUM SERPL-SCNC: 137 MMOL/L — SIGNIFICANT CHANGE UP (ref 135–145)
SP GR SPEC: 1 — LOW (ref 1.01–1.02)
TROPONIN I, HIGH SENSITIVITY RESULT: 10.9 NG/L — SIGNIFICANT CHANGE UP
UROBILINOGEN FLD QL: NEGATIVE — SIGNIFICANT CHANGE UP
VARIANT LYMPHS # BLD: 4 % — SIGNIFICANT CHANGE UP (ref 0–6)
WBC # BLD: 18.83 K/UL — HIGH (ref 3.8–10.5)
WBC # FLD AUTO: 18.83 K/UL — HIGH (ref 3.8–10.5)
WBC UR QL: SIGNIFICANT CHANGE UP

## 2022-01-19 PROCEDURE — 71045 X-RAY EXAM CHEST 1 VIEW: CPT | Mod: 26

## 2022-01-19 PROCEDURE — 99285 EMERGENCY DEPT VISIT HI MDM: CPT

## 2022-01-19 PROCEDURE — 93010 ELECTROCARDIOGRAM REPORT: CPT

## 2022-01-19 RX ORDER — ACETAMINOPHEN 500 MG
650 TABLET ORAL EVERY 6 HOURS
Refills: 0 | Status: DISCONTINUED | OUTPATIENT
Start: 2022-01-19 | End: 2022-01-21

## 2022-01-19 RX ORDER — LOSARTAN POTASSIUM 100 MG/1
100 TABLET, FILM COATED ORAL DAILY
Refills: 0 | Status: DISCONTINUED | OUTPATIENT
Start: 2022-01-19 | End: 2022-01-21

## 2022-01-19 RX ORDER — LANSOPRAZOLE 15 MG/1
1 CAPSULE, DELAYED RELEASE ORAL
Qty: 0 | Refills: 0 | DISCHARGE

## 2022-01-19 RX ORDER — SODIUM CHLORIDE 9 MG/ML
1000 INJECTION, SOLUTION INTRAVENOUS
Refills: 0 | Status: DISCONTINUED | OUTPATIENT
Start: 2022-01-19 | End: 2022-01-21

## 2022-01-19 RX ORDER — AMLODIPINE BESYLATE 2.5 MG/1
2.5 TABLET ORAL DAILY
Refills: 0 | Status: DISCONTINUED | OUTPATIENT
Start: 2022-01-19 | End: 2022-01-21

## 2022-01-19 RX ORDER — CLOPIDOGREL BISULFATE 75 MG/1
1 TABLET, FILM COATED ORAL
Qty: 0 | Refills: 0 | DISCHARGE

## 2022-01-19 RX ORDER — LOSARTAN POTASSIUM 100 MG/1
1 TABLET, FILM COATED ORAL
Qty: 0 | Refills: 0 | DISCHARGE

## 2022-01-19 RX ORDER — NYSTATIN CREAM 100000 [USP'U]/G
1 CREAM TOPICAL THREE TIMES A DAY
Refills: 0 | Status: DISCONTINUED | OUTPATIENT
Start: 2022-01-19 | End: 2022-01-21

## 2022-01-19 RX ORDER — ONDANSETRON 8 MG/1
4 TABLET, FILM COATED ORAL EVERY 8 HOURS
Refills: 0 | Status: DISCONTINUED | OUTPATIENT
Start: 2022-01-19 | End: 2022-01-21

## 2022-01-19 RX ORDER — ROSUVASTATIN CALCIUM 5 MG/1
1 TABLET ORAL
Qty: 0 | Refills: 0 | DISCHARGE

## 2022-01-19 RX ORDER — AZTREONAM 2 G
VIAL (EA) INJECTION
Refills: 0 | Status: DISCONTINUED | OUTPATIENT
Start: 2022-01-19 | End: 2022-01-19

## 2022-01-19 RX ORDER — DEXTROSE 50 % IN WATER 50 %
15 SYRINGE (ML) INTRAVENOUS ONCE
Refills: 0 | Status: DISCONTINUED | OUTPATIENT
Start: 2022-01-19 | End: 2022-01-21

## 2022-01-19 RX ORDER — PANTOPRAZOLE SODIUM 20 MG/1
1 TABLET, DELAYED RELEASE ORAL
Qty: 0 | Refills: 0 | DISCHARGE

## 2022-01-19 RX ORDER — AMLODIPINE BESYLATE 2.5 MG/1
1 TABLET ORAL
Qty: 0 | Refills: 0 | DISCHARGE

## 2022-01-19 RX ORDER — SODIUM CHLORIDE 9 MG/ML
1000 INJECTION INTRAMUSCULAR; INTRAVENOUS; SUBCUTANEOUS ONCE
Refills: 0 | Status: COMPLETED | OUTPATIENT
Start: 2022-01-19 | End: 2022-01-19

## 2022-01-19 RX ORDER — INSULIN LISPRO 100/ML
VIAL (ML) SUBCUTANEOUS AT BEDTIME
Refills: 0 | Status: DISCONTINUED | OUTPATIENT
Start: 2022-01-19 | End: 2022-01-21

## 2022-01-19 RX ORDER — DEXTROSE 50 % IN WATER 50 %
25 SYRINGE (ML) INTRAVENOUS ONCE
Refills: 0 | Status: DISCONTINUED | OUTPATIENT
Start: 2022-01-19 | End: 2022-01-21

## 2022-01-19 RX ORDER — INSULIN GLARGINE 100 [IU]/ML
46 INJECTION, SOLUTION SUBCUTANEOUS
Qty: 0 | Refills: 0 | DISCHARGE

## 2022-01-19 RX ORDER — CARVEDILOL PHOSPHATE 80 MG/1
25 CAPSULE, EXTENDED RELEASE ORAL EVERY 12 HOURS
Refills: 0 | Status: DISCONTINUED | OUTPATIENT
Start: 2022-01-19 | End: 2022-01-21

## 2022-01-19 RX ORDER — AZTREONAM 2 G
1000 VIAL (EA) INJECTION ONCE
Refills: 0 | Status: COMPLETED | OUTPATIENT
Start: 2022-01-19 | End: 2022-01-19

## 2022-01-19 RX ORDER — ENOXAPARIN SODIUM 100 MG/ML
40 INJECTION SUBCUTANEOUS DAILY
Refills: 0 | Status: DISCONTINUED | OUTPATIENT
Start: 2022-01-19 | End: 2022-01-21

## 2022-01-19 RX ORDER — DULOXETINE HYDROCHLORIDE 30 MG/1
30 CAPSULE, DELAYED RELEASE ORAL DAILY
Refills: 0 | Status: DISCONTINUED | OUTPATIENT
Start: 2022-01-19 | End: 2022-01-21

## 2022-01-19 RX ORDER — VANCOMYCIN HCL 1 G
1000 VIAL (EA) INTRAVENOUS EVERY 12 HOURS
Refills: 0 | Status: DISCONTINUED | OUTPATIENT
Start: 2022-01-20 | End: 2022-01-20

## 2022-01-19 RX ORDER — ATORVASTATIN CALCIUM 80 MG/1
80 TABLET, FILM COATED ORAL AT BEDTIME
Refills: 0 | Status: DISCONTINUED | OUTPATIENT
Start: 2022-01-19 | End: 2022-01-21

## 2022-01-19 RX ORDER — VANCOMYCIN HCL 1 G
1000 VIAL (EA) INTRAVENOUS ONCE
Refills: 0 | Status: COMPLETED | OUTPATIENT
Start: 2022-01-19 | End: 2022-01-19

## 2022-01-19 RX ORDER — PANTOPRAZOLE SODIUM 20 MG/1
40 TABLET, DELAYED RELEASE ORAL
Refills: 0 | Status: DISCONTINUED | OUTPATIENT
Start: 2022-01-19 | End: 2022-01-21

## 2022-01-19 RX ORDER — AZTREONAM 2 G
1000 VIAL (EA) INJECTION EVERY 8 HOURS
Refills: 0 | Status: DISCONTINUED | OUTPATIENT
Start: 2022-01-20 | End: 2022-01-20

## 2022-01-19 RX ORDER — ALBUTEROL 90 UG/1
2 AEROSOL, METERED ORAL EVERY 6 HOURS
Refills: 0 | Status: DISCONTINUED | OUTPATIENT
Start: 2022-01-19 | End: 2022-01-21

## 2022-01-19 RX ORDER — EZETIMIBE 10 MG/1
1 TABLET ORAL
Qty: 0 | Refills: 0 | DISCHARGE

## 2022-01-19 RX ORDER — ERGOCALCIFEROL 1.25 MG/1
50000 CAPSULE ORAL
Refills: 0 | Status: DISCONTINUED | OUTPATIENT
Start: 2022-01-19 | End: 2022-01-21

## 2022-01-19 RX ORDER — CLOPIDOGREL BISULFATE 75 MG/1
75 TABLET, FILM COATED ORAL DAILY
Refills: 0 | Status: DISCONTINUED | OUTPATIENT
Start: 2022-01-19 | End: 2022-01-21

## 2022-01-19 RX ORDER — LANOLIN ALCOHOL/MO/W.PET/CERES
3 CREAM (GRAM) TOPICAL AT BEDTIME
Refills: 0 | Status: DISCONTINUED | OUTPATIENT
Start: 2022-01-19 | End: 2022-01-21

## 2022-01-19 RX ORDER — DEXTROSE 50 % IN WATER 50 %
12.5 SYRINGE (ML) INTRAVENOUS ONCE
Refills: 0 | Status: DISCONTINUED | OUTPATIENT
Start: 2022-01-19 | End: 2022-01-21

## 2022-01-19 RX ORDER — DULOXETINE HYDROCHLORIDE 30 MG/1
1 CAPSULE, DELAYED RELEASE ORAL
Qty: 0 | Refills: 0 | DISCHARGE

## 2022-01-19 RX ORDER — CARVEDILOL PHOSPHATE 80 MG/1
1 CAPSULE, EXTENDED RELEASE ORAL
Qty: 0 | Refills: 0 | DISCHARGE

## 2022-01-19 RX ORDER — GLUCAGON INJECTION, SOLUTION 0.5 MG/.1ML
1 INJECTION, SOLUTION SUBCUTANEOUS ONCE
Refills: 0 | Status: DISCONTINUED | OUTPATIENT
Start: 2022-01-19 | End: 2022-01-21

## 2022-01-19 RX ORDER — INSULIN LISPRO 100/ML
VIAL (ML) SUBCUTANEOUS
Refills: 0 | Status: DISCONTINUED | OUTPATIENT
Start: 2022-01-19 | End: 2022-01-21

## 2022-01-19 RX ORDER — ASPIRIN/CALCIUM CARB/MAGNESIUM 324 MG
81 TABLET ORAL DAILY
Refills: 0 | Status: DISCONTINUED | OUTPATIENT
Start: 2022-01-19 | End: 2022-01-21

## 2022-01-19 RX ORDER — EMPAGLIFLOZIN 10 MG/1
1 TABLET, FILM COATED ORAL
Qty: 0 | Refills: 0 | DISCHARGE

## 2022-01-19 RX ORDER — DILTIAZEM HCL 120 MG
1 CAPSULE, EXT RELEASE 24 HR ORAL
Qty: 0 | Refills: 0 | DISCHARGE

## 2022-01-19 RX ADMIN — SODIUM CHLORIDE 1000 MILLILITER(S): 9 INJECTION INTRAMUSCULAR; INTRAVENOUS; SUBCUTANEOUS at 19:54

## 2022-01-19 RX ADMIN — SODIUM CHLORIDE 1000 MILLILITER(S): 9 INJECTION INTRAMUSCULAR; INTRAVENOUS; SUBCUTANEOUS at 19:29

## 2022-01-19 RX ADMIN — Medication 1000 MILLIGRAM(S): at 21:02

## 2022-01-19 RX ADMIN — SODIUM CHLORIDE 1000 MILLILITER(S): 9 INJECTION INTRAMUSCULAR; INTRAVENOUS; SUBCUTANEOUS at 21:03

## 2022-01-19 RX ADMIN — Medication 250 MILLIGRAM(S): at 19:27

## 2022-01-19 RX ADMIN — SODIUM CHLORIDE 1000 MILLILITER(S): 9 INJECTION INTRAMUSCULAR; INTRAVENOUS; SUBCUTANEOUS at 18:37

## 2022-01-19 RX ADMIN — Medication 50 MILLIGRAM(S): at 21:06

## 2022-01-19 NOTE — H&P ADULT - PROBLEM SELECTOR PLAN 3
SPO2 ~95% on RA  Will defer steroids, antivirals for now   Supportive care  Monitor respiratory status  ID Dr. Matos consulted SPO2 ~95% on RA  Will defer steroids, antivirals for now as asymptomatic , RA O2 Sat- Nl   Supportive care  Monitor respiratory status  ID Dr. Matos consulted -Pt with leukocytosis ? etiology , afebrile  Daughter endorses patient has history of leukemia, but cannot recall which type  As per daughter, patient is in remission, not on chemotherapy  CBC in AM   Abx to continue

## 2022-01-19 NOTE — H&P ADULT - GASTROINTESTINAL DETAILS
nontender/no masses palpable/no rigidity normal/soft/nontender/no masses palpable/bowel sounds normal/no bruit/no rebound tenderness/no guarding/no rigidity/no organomegaly

## 2022-01-19 NOTE — ED PROVIDER NOTE - NSICDXPASTSURGICALHX_GEN_ALL_CORE_FT
PAST SURGICAL HISTORY:  H/O  section     History of cholecystectomy     History of left cataract surgery

## 2022-01-19 NOTE — H&P ADULT - ASSESSMENT
62 y/o F PMH CVA (1/2021, w/ residual L sided weakness, on ASA, plavix), leukemia (unknown type), T2DM, HTN, asthma, GERD presenting with weakness for 3 days, leukocytosis to 19, suspect infected stage III sacral ulcer.  64 y/o F PMH CVA (1/2021, w/ residual L sided weakness, on ASA, plavix), leukemia (unknown type), T2DM, HTN, asthma, GERD presenting with weakness for 3 days, leukocytosis , sacral pressure ulcer & fungal rash, + COVID

## 2022-01-19 NOTE — H&P ADULT - HISTORY OF PRESENT ILLNESS
CHARTING IN PROGRESS    64 y/o F with hx of asthma, DM, HTN, GERD presenting with worsening sacral decubitus ulcer and weakness for 3 days. Family states patient has recently worsened.    ED course:   Vitals 97.3, HR 91, 133/70, RR 15, 95% RA  Labs WBC 19, lactate 3.2, trop wnl, COVID (+)  Given: vanco 1g x1, aztreonam 1g x1, 2L NS bolus 62 y/o F with hx of asthma, DM, HTN, GERD presenting with worsening sacral decubitus ulcer and weakness for 3 days. Daughter endorses she was Family states patient has recently worsened.    ED course:   Vitals 97.3, HR 91, 133/70, RR 15, 95% RA  Labs WBC 19, lactate 3.2, trop wnl, COVID (+)  Given: vanco 1g x1, aztreonam 1g x1, 2L NS bolus 62 y/o F PMH CVA (1/2021, w/ residual L sided weakness, on ASA, plavix), leukemia (unknown type), T2DM, HTN, asthma, GERD presenting with worsening sacral decubitus ulcer and weakness for 3 days. Daughter endorses patient has been acting "funny" for 3 days, much weaker, lethargic, less responsive, which prompted family to bring her in to the ED. Daughter also endorses erythema of sacral area for 3 weeks, now progressing to involve satellite lesions of upper posterior and anterior thigh. Patient denies pruritis, warmth, pain. Denies fevers, chills, abdominal pain, n/v/d/c at home. At baseline, pt is incontinent of urine, mostly bedbound due to residual CVA weakness, requires assistance in all ADLs. Of note, pt found to be COVID (+) in ED, and she received her Pfizer 2nd vaccine yesterday 1/18. Denies sx including fever, chills, SOB, cough that is changed from her baseline cough, sore throat. Daughter denies sick contacts at home, but her family works outside the home.     ED course:   Vitals 97.3, HR 91, 133/70, RR 15, 95% RA  Labs WBC 19, lactate 3.2, trop wnl, COVID (+)  Given: vanco 1g x1, aztreonam 1g x1, 2L NS bolus

## 2022-01-19 NOTE — H&P ADULT - NEUROLOGICAL DETAILS
alert and oriented x 3 left sided weakness/alert and oriented x 3/sensation intact/cranial nerves intact/strength decreased

## 2022-01-19 NOTE — H&P ADULT - PROBLEM SELECTOR PLAN 1
Patient is mostly bedbound after 2021 CVA, also incontinent of urine  Given in ED vancomycin 1g x1, aztreonam 1g x1 (allergic to penicillin)  Continue vancomycin, aztreonam while inpatient  Check MRSA PCR  Check ESR, CRP  F/u blood cultures  Monitor for fevers  q2 turning  Day team to consult wound care   ID Dr. Matos consulted Patient is mostly bedbound after 2021 CVA, also incontinent of urine  WBC 19, lactate 3.2 on admission  Given in ED vancomycin 1g x1, aztreonam 1g x1 (allergic to penicillin)  Continue vancomycin, aztreonam while inpatient  Check MRSA PCR  Check ESR, CRP  F/u blood cultures  Monitor for fevers  q2 turning  Day team to consult wound care   ID Dr. Matos consulted Patient is mostly bedbound after 2021 CVA, also incontinent of urine with diaper Rash/Fungal Rash  WBC 19, lactate 3.2 on admission  Given in ED vancomycin 1g x1, aztreonam 1g x1 (allergic to penicillin)  Continue vancomycin, aztreonam while inpatient  Check MRSA PCR  Check ESR, CRP  F/u blood cultures  Monitor for fevers  q2 turning  Day team to consult wound care   ID Dr. Matos consulted SPO2 ~95% on RA  Will defer steroids, antivirals for now as asymptomatic , RA O2 Sat- Nl   Supportive care  Monitor respiratory status  ID Dr. Matos consulted

## 2022-01-19 NOTE — ED ADULT NURSE NOTE - OBJECTIVE STATEMENT
Pt A/O x 4, brought in by daughter to have redness on left buttocks and ecchymosis on left hip checked. Pt has PMH of stroke 1 year ago and has left side deficits.

## 2022-01-19 NOTE — ED PROVIDER NOTE - CARE PLAN
Principal Discharge DX:	Sacral decubitus ulcer  Secondary Diagnosis:	Leukocytosis, unspecified type  Secondary Diagnosis:	2019 novel coronavirus disease (COVID-19)   1

## 2022-01-19 NOTE — ED PROVIDER NOTE - NSICDXPASTMEDICALHX_GEN_ALL_CORE_FT
PAST MEDICAL HISTORY:  Asthma     Diabetes     Gastroesophageal reflux disease without esophagitis     HTN (hypertension)

## 2022-01-19 NOTE — H&P ADULT - PROBLEM SELECTOR PLAN 7
Daughter endorses patient has history of leukemia, but cannot recall which type  As per daughter, patient is in remission, not on chemotherapy Chronic, well controlled  Continue home antihypertensive regimen: coreg, amlodipine, losartan  Routine hemodynamic monitoring

## 2022-01-19 NOTE — H&P ADULT - BIRTH SEX
Physical Therapy Daily Treatment    Visit Count: 6  Plan of Care: 4/1/2019 Through: 5/13/2019  Insurance Information:   [facility] AND [facility]/[place] MEDICAID HMO  ID#: [id number]      No authoriztion required.  No Co-pay.  All visits based on medical necessity.     Referred by: Navi Marie MD; Next provider visit (if known/scheduled): 6/24/19  Medical Diagnosis (from order):  Acute low back pain, unspecified back pain laterality, with sciatica presence unspecified [M54.5]  Treatment Diagnosis: Lumbar symptoms with increased pain/symptoms, impaired posture, impaired strength, impaired range of motion     Date of onset/injury: chronic  Diagnosis Precautions: none  Chart reviewed at time of initial evaluation (relevant co-morbidities, allergies, tests and medications listed): attention deficit disorder, bipolar disorder, multiple sclerosis      SUBJECTIVE   Dug a garden by hand this weekend for about 5-6 hours a day.  Is very sore from gardening.    Current Pain (0-10 scale): 4-5/10 to low back.      Functional Change: Able to drive to [place] Seen on TV 2 times without having increased pain.     OBJECTIVE   See initial evaluation from 4/2/19      Treatment     Therapeutic Exercise: 20 minutes  Exercise Repetitions Sets Position/Cues   4 point cat/camel stretch 5 1 Hold each position 5 seconds   Child's pose 30 seconds 1    Double knee to chest stretch  30 seconds 2    Supine hamstring stretch  30 seconds 2 bilateral    Supine piriformis stretch  -  bilateral    Lumbar trunk rotation 30 seconds 1 Bilateral    Transverse abdominus brace in hooklying  - 1    Transversus Abdominus brace with bridging + clamshells vs green band 10 1 3 second hold   Standing spine stretch -      Bridging with Transverse abdominas bracing  - 1     Marching Transverse abdominas bracing  - 1 Bilateral    Sidelying hip abduction 15 1 Bilateral    Prone hip extension -  Stopped due to discomfort to low back   4-point hip extension -   alternating   4-point fire hydrant 10 1 Bilateral    Prone on elbows stretch  -     Comments:     Manual: x 15 minutes  Pt prone:   Stationary, burping and cupping with movement to Bilateral lumbar paraspinals and right lower thoracic paraspinals with 2 medium cups.         Skilled input: cueing for form with new exercises    Home Program:   above=instructed home program    Exercise: Date issued Date DC Comments   Supine hamstring stretch, supine piriformis stretch, Transverse abdominus brace, Standing spine stretch evaluation       Sidelying hip abduction, bridging vs green theraband, 4-point hip extension                                            Writer verbally educated the patient and received verbal consent from the patient on hand placement, positioning of patient, and techniques to be performed today including clothing adjustments for techniques, hand placement and palpation for techniques as described above and how they are pertinent to the patient's plan of care.      Suggestions for next session as indicated: progress per plan of care, review HEP, progress core and hip strengthening as able.  Manual for soft tissue restrictions    ASSESSMENT   Short session due to patient being late for appointment.  Less time completed with cupping.  Progressed core and hip strengthening.  Reported fatigue with progression but no increase in pain.  Added core/hip strengthening to HEP.   Pain after treatment (patient reported, 0-10 scale): 2/10 to thoracic/lumbar spine.  Result of above outlined education: Needs reinforcement    THERAPY DAILY BILLING   Insurance: [facility] [employer] 2. N/A    Evaluation Procedures:  No evaluation codes were used on this date of service    Timed Procedures:  Manual Therapy, 15 minutes  Therapeutic Exercise, 20 minutes    Untimed Procedures:  No untimed codes were used on this date of service    Total Treatment Time: 35 minutes   Female

## 2022-01-19 NOTE — H&P ADULT - PROBLEM SELECTOR PLAN 4
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

## 2022-01-19 NOTE — H&P ADULT - NEGATIVE NEUROLOGICAL SYMPTOMS
no paresthesias/no generalized seizures/no focal seizures/no syncope/no tremors no paresthesias/no generalized seizures/no focal seizures/no syncope/no tremors/no headache

## 2022-01-19 NOTE — H&P ADULT - PROBLEM SELECTOR PLAN 2
Likely due to infectious process, differential of COVID vs infected sacral decubitus ulcer  Fall precautions  Blood cultures, urine cultures pending, f/u results  Monitor for fevers  ID Dr. Matos consulted Likely due to infectious process, differential of COVID vs infected sacral decubitus ulcer  WBC 19, lactate 3.2 on admission  Antibiotics as above  Blood cultures, urine cultures pending, f/u results  Fall precautions  Monitor for fevers  ID Dr. Matos consulted -Pt with leukocytosis ? etiology , afebrile  Daughter endorses patient has history of leukemia, but cannot recall which type  As per daughter, patient is in remission, not on chemotherapy  CBC in AM   Abx to continue Patient is mostly bedbound after 2021 CVA, also incontinent of urine with diaper Rash/Fungal Rash  WBC 19, lactate 3.2 on admission  Given in ED vancomycin 1g x1, aztreonam 1g x1 (allergic to penicillin)  Continue vancomycin, aztreonam while inpatient  Check MRSA PCR  Check ESR, CRP  F/u blood cultures  Monitor for fevers  q2 turning  Day team to consult wound care   ID Dr. Matos consulted

## 2022-01-19 NOTE — H&P ADULT - RS GEN PE MLT RESP DETAILS PC
normal/respirations non-labored/clear to auscultation bilaterally/no intercostal retractions/no rales/no rhonchi/no wheezes

## 2022-01-19 NOTE — H&P ADULT - NSHPSOCIALHISTORY_GEN_ALL_CORE
Tobacco:   EtOH:   Recreational drug use:  Lives with:  Ambulates:  ADLs:  Occupation:  Vaccinations: Tobacco: denies  EtOH: denies  Recreational drug use: denies  Lives with: daughter's family  Ambulates: mostly bedbound, but can move her LEs  ADLs: requires assistance  Vaccinations: Pfizer x2, most recent 1/18

## 2022-01-19 NOTE — H&P ADULT - NSICDXPASTMEDICALHX_GEN_ALL_CORE_FT
PAST MEDICAL HISTORY:  Asthma     Cerebrovascular accident (CVA) residual L sided weakness    Diabetes     Gastroesophageal reflux disease without esophagitis     HTN (hypertension)

## 2022-01-19 NOTE — H&P ADULT - PROBLEM SELECTOR PLAN 5
Glucose in 300s on admission  Hold home metformin & jardiance while inpatient  Moderate dose insulin corrective scale   Check A1C  Consistent carb diet

## 2022-01-19 NOTE — ED PROVIDER NOTE - PHYSICAL EXAMINATION
mod sized sacral decub, partially open. No palpable abscess mod sized sacral decub, partially open. No palpable abscess  L buttocks area of erythema with some assoc diaper rash  Some ant diaper rash to L anterior pelvis. NO signs of secondary infxn

## 2022-01-19 NOTE — ED PROVIDER NOTE - OBJECTIVE STATEMENT
65 yo F p/w BIB family for recently worsening sacral decub, some opening. No cp/sob. no known fver. Pt with some increased gen weakness over past ~ 3 days. NO cough/ uri. no covid exposures, pt fully vaccinated. no neck / back pain. no abd pain. no n/v/d. no recent travel / sick contacts. no other acute co or changes.  Daughter María [phone]

## 2022-01-19 NOTE — H&P ADULT - PROBLEM SELECTOR PLAN 6
Chronic, well controlled  Continue home antihypertensive regimen: coreg, amlodipine, losartan  Routine hemodynamic monitoring Likely due to  COVID ,  sacral decubitus ulcer  WBC 19, lactate 3.2 on admission  Antibiotics as above  Blood cultures, urine cultures pending, f/u results  Fall precautions  Monitor for fevers  ID Dr. Matos consulted

## 2022-01-19 NOTE — ED PROVIDER NOTE - ENMT, MLM
Airway patent, Nasal mucosa clear. Mouth with normal mucosa. Throat has no vesicles, no oropharyngeal exudates and uvula is midline. MM MOist. neck supple., no meningeal signs

## 2022-01-19 NOTE — H&P ADULT - ATTENDING COMMENTS
64 y/o F PMH CVA (1/2021, w/ residual L sided weakness, on ASA, plavix), leukemia (unknown type), T2DM, HTN, asthma, GERD presenting with weakness for 3 days, leukocytosis , sacral pressure ulcer & fungal rash, + COVID   Pt seen, examined, case & care plan d/w pt, residents at detail.  AM Labs   PT Eval  PO diet  ID -DR Matos  Skin Care -wound care RN Daly

## 2022-01-20 ENCOUNTER — TRANSCRIPTION ENCOUNTER (OUTPATIENT)
Age: 67
End: 2022-01-20

## 2022-01-20 DIAGNOSIS — B37.2 CANDIDIASIS OF SKIN AND NAIL: ICD-10-CM

## 2022-01-20 LAB
A1C WITH ESTIMATED AVERAGE GLUCOSE RESULT: 12.5 % — HIGH (ref 4–5.6)
ALBUMIN SERPL ELPH-MCNC: 2.6 G/DL — LOW (ref 3.3–5)
ALP SERPL-CCNC: 117 U/L — SIGNIFICANT CHANGE UP (ref 40–120)
ALT FLD-CCNC: 38 U/L — SIGNIFICANT CHANGE UP (ref 12–78)
ANION GAP SERPL CALC-SCNC: 7 MMOL/L — SIGNIFICANT CHANGE UP (ref 5–17)
AST SERPL-CCNC: 21 U/L — SIGNIFICANT CHANGE UP (ref 15–37)
BASOPHILS # BLD AUTO: 0.05 K/UL — SIGNIFICANT CHANGE UP (ref 0–0.2)
BASOPHILS NFR BLD AUTO: 0.2 % — SIGNIFICANT CHANGE UP (ref 0–2)
BILIRUB SERPL-MCNC: 0.4 MG/DL — SIGNIFICANT CHANGE UP (ref 0.2–1.2)
BUN SERPL-MCNC: 12 MG/DL — SIGNIFICANT CHANGE UP (ref 7–23)
CALCIUM SERPL-MCNC: 9.1 MG/DL — SIGNIFICANT CHANGE UP (ref 8.5–10.1)
CHLORIDE SERPL-SCNC: 106 MMOL/L — SIGNIFICANT CHANGE UP (ref 96–108)
CO2 SERPL-SCNC: 27 MMOL/L — SIGNIFICANT CHANGE UP (ref 22–31)
CREAT SERPL-MCNC: 0.49 MG/DL — LOW (ref 0.5–1.3)
CRP SERPL-MCNC: 16 MG/L — HIGH
CULTURE RESULTS: SIGNIFICANT CHANGE UP
EOSINOPHIL # BLD AUTO: 0.18 K/UL — SIGNIFICANT CHANGE UP (ref 0–0.5)
EOSINOPHIL NFR BLD AUTO: 0.9 % — SIGNIFICANT CHANGE UP (ref 0–6)
ERYTHROCYTE [SEDIMENTATION RATE] IN BLOOD: 40 MM/HR — HIGH (ref 0–20)
ESTIMATED AVERAGE GLUCOSE: 312 MG/DL — HIGH (ref 68–114)
GLUCOSE SERPL-MCNC: 144 MG/DL — HIGH (ref 70–99)
HCT VFR BLD CALC: 39.5 % — SIGNIFICANT CHANGE UP (ref 34.5–45)
HCV AB S/CO SERPL IA: 0.22 S/CO — SIGNIFICANT CHANGE UP (ref 0–0.99)
HCV AB SERPL-IMP: SIGNIFICANT CHANGE UP
HGB BLD-MCNC: 12.9 G/DL — SIGNIFICANT CHANGE UP (ref 11.5–15.5)
IMM GRANULOCYTES NFR BLD AUTO: 0.4 % — SIGNIFICANT CHANGE UP (ref 0–1.5)
LACTATE SERPL-SCNC: 1.1 MMOL/L — SIGNIFICANT CHANGE UP (ref 0.7–2)
LACTATE SERPL-SCNC: 2 MMOL/L — SIGNIFICANT CHANGE UP (ref 0.7–2)
LYMPHOCYTES # BLD AUTO: 12.5 K/UL — HIGH (ref 1–3.3)
LYMPHOCYTES # BLD AUTO: 61.5 % — HIGH (ref 13–44)
MCHC RBC-ENTMCNC: 26.8 PG — LOW (ref 27–34)
MCHC RBC-ENTMCNC: 32.7 GM/DL — SIGNIFICANT CHANGE UP (ref 32–36)
MCV RBC AUTO: 82 FL — SIGNIFICANT CHANGE UP (ref 80–100)
MONOCYTES # BLD AUTO: 0.8 K/UL — SIGNIFICANT CHANGE UP (ref 0–0.9)
MONOCYTES NFR BLD AUTO: 3.9 % — SIGNIFICANT CHANGE UP (ref 2–14)
NEUTROPHILS # BLD AUTO: 6.73 K/UL — SIGNIFICANT CHANGE UP (ref 1.8–7.4)
NEUTROPHILS NFR BLD AUTO: 33.1 % — LOW (ref 43–77)
NRBC # BLD: 0 /100 WBCS — SIGNIFICANT CHANGE UP (ref 0–0)
PLATELET # BLD AUTO: 480 K/UL — HIGH (ref 150–400)
POTASSIUM SERPL-MCNC: 3.5 MMOL/L — SIGNIFICANT CHANGE UP (ref 3.5–5.3)
POTASSIUM SERPL-SCNC: 3.5 MMOL/L — SIGNIFICANT CHANGE UP (ref 3.5–5.3)
PROT SERPL-MCNC: 7 G/DL — SIGNIFICANT CHANGE UP (ref 6–8.3)
RBC # BLD: 4.82 M/UL — SIGNIFICANT CHANGE UP (ref 3.8–5.2)
RBC # FLD: 13.8 % — SIGNIFICANT CHANGE UP (ref 10.3–14.5)
SODIUM SERPL-SCNC: 140 MMOL/L — SIGNIFICANT CHANGE UP (ref 135–145)
SPECIMEN SOURCE: SIGNIFICANT CHANGE UP
WBC # BLD: 20.34 K/UL — HIGH (ref 3.8–10.5)
WBC # FLD AUTO: 20.34 K/UL — HIGH (ref 3.8–10.5)

## 2022-01-20 PROCEDURE — 99221 1ST HOSP IP/OBS SF/LOW 40: CPT

## 2022-01-20 RX ORDER — POTASSIUM CHLORIDE 20 MEQ
40 PACKET (EA) ORAL ONCE
Refills: 0 | Status: COMPLETED | OUTPATIENT
Start: 2022-01-20 | End: 2022-01-20

## 2022-01-20 RX ORDER — INSULIN GLARGINE 100 [IU]/ML
10 INJECTION, SOLUTION SUBCUTANEOUS AT BEDTIME
Refills: 0 | Status: DISCONTINUED | OUTPATIENT
Start: 2022-01-20 | End: 2022-01-21

## 2022-01-20 RX ORDER — SODIUM CHLORIDE 9 MG/ML
250 INJECTION INTRAMUSCULAR; INTRAVENOUS; SUBCUTANEOUS
Refills: 0 | Status: COMPLETED | OUTPATIENT
Start: 2022-01-20 | End: 2022-01-20

## 2022-01-20 RX ORDER — SOTROVIMAB 62.5 MG/ML
500 INJECTION, SOLUTION, CONCENTRATE INTRAVENOUS ONCE
Refills: 0 | Status: COMPLETED | OUTPATIENT
Start: 2022-01-20 | End: 2022-01-20

## 2022-01-20 RX ADMIN — NYSTATIN CREAM 1 APPLICATION(S): 100000 CREAM TOPICAL at 21:12

## 2022-01-20 RX ADMIN — INSULIN GLARGINE 10 UNIT(S): 100 INJECTION, SOLUTION SUBCUTANEOUS at 21:11

## 2022-01-20 RX ADMIN — DULOXETINE HYDROCHLORIDE 30 MILLIGRAM(S): 30 CAPSULE, DELAYED RELEASE ORAL at 11:26

## 2022-01-20 RX ADMIN — Medication 81 MILLIGRAM(S): at 11:26

## 2022-01-20 RX ADMIN — SODIUM CHLORIDE 100 MILLILITER(S): 9 INJECTION INTRAMUSCULAR; INTRAVENOUS; SUBCUTANEOUS at 17:57

## 2022-01-20 RX ADMIN — Medication 6: at 16:38

## 2022-01-20 RX ADMIN — ATORVASTATIN CALCIUM 80 MILLIGRAM(S): 80 TABLET, FILM COATED ORAL at 02:35

## 2022-01-20 RX ADMIN — AMLODIPINE BESYLATE 2.5 MILLIGRAM(S): 2.5 TABLET ORAL at 06:29

## 2022-01-20 RX ADMIN — NYSTATIN CREAM 1 APPLICATION(S): 100000 CREAM TOPICAL at 08:11

## 2022-01-20 RX ADMIN — Medication 250 MILLIGRAM(S): at 09:06

## 2022-01-20 RX ADMIN — CARVEDILOL PHOSPHATE 25 MILLIGRAM(S): 80 CAPSULE, EXTENDED RELEASE ORAL at 17:35

## 2022-01-20 RX ADMIN — SOTROVIMAB 116 MILLIGRAM(S): 62.5 INJECTION, SOLUTION, CONCENTRATE INTRAVENOUS at 18:15

## 2022-01-20 RX ADMIN — Medication 2: at 07:51

## 2022-01-20 RX ADMIN — Medication 50 MILLIGRAM(S): at 08:11

## 2022-01-20 RX ADMIN — Medication 40 MILLIEQUIVALENT(S): at 13:51

## 2022-01-20 RX ADMIN — CARVEDILOL PHOSPHATE 25 MILLIGRAM(S): 80 CAPSULE, EXTENDED RELEASE ORAL at 06:29

## 2022-01-20 RX ADMIN — ENOXAPARIN SODIUM 40 MILLIGRAM(S): 100 INJECTION SUBCUTANEOUS at 11:26

## 2022-01-20 RX ADMIN — LOSARTAN POTASSIUM 100 MILLIGRAM(S): 100 TABLET, FILM COATED ORAL at 06:29

## 2022-01-20 RX ADMIN — NYSTATIN CREAM 1 APPLICATION(S): 100000 CREAM TOPICAL at 13:52

## 2022-01-20 RX ADMIN — Medication 4: at 12:03

## 2022-01-20 RX ADMIN — PANTOPRAZOLE SODIUM 40 MILLIGRAM(S): 20 TABLET, DELAYED RELEASE ORAL at 06:29

## 2022-01-20 RX ADMIN — ATORVASTATIN CALCIUM 80 MILLIGRAM(S): 80 TABLET, FILM COATED ORAL at 21:11

## 2022-01-20 RX ADMIN — CLOPIDOGREL BISULFATE 75 MILLIGRAM(S): 75 TABLET, FILM COATED ORAL at 11:26

## 2022-01-20 RX ADMIN — ERGOCALCIFEROL 50000 UNIT(S): 1.25 CAPSULE ORAL at 06:30

## 2022-01-20 NOTE — PROGRESS NOTE ADULT - PROBLEM SELECTOR PLAN 5
Glucose in 300s on admission  Hold home metformin & jardiance while inpatient  Moderate dose insulin corrective scale   Check A1C  Consistent carb diet Glucose in 300s on admission  Hold home metformin & jardiance while inpatient  Moderate dose insulin corrective scale   A1C 12.5%  Consistent carb diet CVA 1/2021 with residual L sided weakness  Continue home ASA, plavix  Lipitor as therapeutic interchange for home Crestor  Fall precautions  PT consult

## 2022-01-20 NOTE — PROGRESS NOTE ADULT - PROBLEM SELECTOR PLAN 4
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

## 2022-01-20 NOTE — CONSULT NOTE ADULT - ASSESSMENT
62 y/o F PMH CVA (1/2021, w/ residual L sided weakness, on ASA, plavix), leukemia, T2DM, asthma, who presented with worsening sacral decubitus ulcer and weakness for 3 days. Found to be COVID positive. She is on room air and CXR without evidence of pneumonia.    Sacral area with some redness but does not appear infected. She has some lesions around the groin area near the diaper line which appear to be fungal in appearance. She has a leukocytosis of 20 but based on prior labs this is in the range of her baseline in the setting of her leukemia.     -AC per institution protocol  -discontinue vancomycin and aztreonam, observe off antibiotics  -recommend applying clotrimazole cream on groin area  -follow blood cultures to completion  -wound care    Thank you for courtesy of this consult.     Will follow.  Discussed with the primary team.     Taylor Perdomo MD  Division of Infectious Diseases   Cell [phone] between 8am and 6pm   After 6pm and weekends please call ID service at [phone].

## 2022-01-20 NOTE — PROGRESS NOTE ADULT - SUBJECTIVE AND OBJECTIVE BOX
Patient is a 66y old  Female who presents with a chief complaint of Infected sacral decubitus Ulcer, COVID (2022 19:54)    INTERVAL HPI:    OVERNIGHT EVENTS:    Home Medications:  amLODIPine 2.5 mg oral tablet: 1 tab(s) orally once a day (2022 21:24)  Aspir 81 81 mg oral delayed release tablet: 1 tab(s) orally once a day (2022 21:24)  carvedilol 25 mg oral tablet: 1 tab(s) orally 2 times a day (2022 21:24)  clopidogrel 75 mg oral tablet: 1 tab(s) orally once a day (2022 21:24)  DULoxetine 30 mg oral delayed release capsule: 1 cap(s) orally once a day (at bedtime) (2022 21:24)  ezetimibe 10 mg oral tablet: 1 tab(s) orally once a day (at bedtime) (2022 21:24)  glipiZIDE 10 mg oral tablet: 1 tab(s) orally once a day (2022 21:24)  Jardiance 10 mg oral tablet: 1 tab(s) orally once a day (in the morning) (2022 21:24)  losartan 100 mg oral tablet: 1 tab(s) orally once a day (2022 21:24)  metformin 1000 mg oral tablet: 1 tab(s) orally 2 times a day (2022 21:24)  pantoprazole 40 mg oral delayed release tablet: 1 tab(s) orally once a day (2022 21:24)  Proventil HFA 90 mcg/inh inhalation aerosol: 2 puff(s) inhaled 4 times a day, As Needed (2022 21:24)  rosuvastatin 40 mg oral tablet: 1 tab(s) orally once a day (2022 21:24)  Vitamin D2 50,000 intl units (1.25 mg) oral capsule: 1 cap(s) orally once a week (2022 21:24)    MEDICATIONS  (STANDING):  amLODIPine   Tablet 2.5 milliGRAM(s) Oral daily  aspirin enteric coated 81 milliGRAM(s) Oral daily  atorvastatin 80 milliGRAM(s) Oral at bedtime  aztreonam  IVPB 1000 milliGRAM(s) IV Intermittent every 8 hours  carvedilol 25 milliGRAM(s) Oral every 12 hours  clopidogrel Tablet 75 milliGRAM(s) Oral daily  dextrose 40% Gel 15 Gram(s) Oral once  dextrose 5%. 1000 milliLiter(s) (50 mL/Hr) IV Continuous <Continuous>  dextrose 5%. 1000 milliLiter(s) (100 mL/Hr) IV Continuous <Continuous>  dextrose 50% Injectable 25 Gram(s) IV Push once  dextrose 50% Injectable 12.5 Gram(s) IV Push once  dextrose 50% Injectable 25 Gram(s) IV Push once  DULoxetine 30 milliGRAM(s) Oral daily  enoxaparin Injectable 40 milliGRAM(s) SubCutaneous daily  ergocalciferol 92511 Unit(s) Oral <User Schedule>  glucagon  Injectable 1 milliGRAM(s) IntraMuscular once  insulin lispro (ADMELOG) corrective regimen sliding scale   SubCutaneous three times a day before meals  insulin lispro (ADMELOG) corrective regimen sliding scale   SubCutaneous at bedtime  losartan 100 milliGRAM(s) Oral daily  nystatin Powder 1 Application(s) Topical three times a day  pantoprazole    Tablet 40 milliGRAM(s) Oral before breakfast  vancomycin  IVPB 1000 milliGRAM(s) IV Intermittent every 12 hours    MEDICATIONS  (PRN):  acetaminophen     Tablet .. 650 milliGRAM(s) Oral every 6 hours PRN Temp greater or equal to 38C (100.4F), Mild Pain (1 - 3)  ALBUTerol    90 MICROgram(s) HFA Inhaler 2 Puff(s) Inhalation every 6 hours PRN Shortness of Breath and/or Wheezing  aluminum hydroxide/magnesium hydroxide/simethicone Suspension 30 milliLiter(s) Oral every 4 hours PRN Dyspepsia  melatonin 3 milliGRAM(s) Oral at bedtime PRN Insomnia  ondansetron Injectable 4 milliGRAM(s) IV Push every 8 hours PRN Nausea and/or Vomiting    Allergies:  penicillins (Unknown)    Social History:  Tobacco: denies  EtOH: denies  Recreational drug use: denies  Lives with: daughter's family  Ambulates: mostly bedbound, but can move her LEs  ADLs: requires assistance  Vaccinations: Pfizer x2, most recent  (2022 19:54)    REVIEW OF SYSTEMS:  CONSTITUTIONAL: No fever, No chills, No fatigue, No myalgia, No Body ache, No Weakness  EYES: No eye pain,  No visual disturbances, No discharge, No Redness  ENMT: No ear pain, No nose bleed, No vertigo; No sinus pain, No throat pain, No Congestion  NECK: No pain, No stiffness  RESPIRATORY: No cough, No wheezing, No hemoptysis, No shortness of breath  CARDIOVASCULAR: No chest pain, No palpitations  GASTROINTESTINAL: No abdominal pain, No epigastric pain. No nausea, No vomiting, No diarrhea, No constipation; [  ] BM  GENITOURINARY: No dysuria, No frequency, No urgency, No hematuria, No incontinence  NEUROLOGICAL: No headaches, No dizziness, No numbness, No tingling, No tremors, No weakness  EXTREMITIES: No Swelling, No Pain, No Edema  SKIN: [  ] No itching, burning, rashes, or lesions   MUSCULOSKELETAL: No joint pain, No joint swelling; No muscle pain, No back pain, No extremity pain  PSYCHIATRIC: No depression, No anxiety, No mood swings, No difficulty sleeping at night  PAIN SCALE: [  ] None  [  ] Other-  ROS Unable to obtain due to: [  ] Dementia  [  ] Lethargy  [  ] Sedated  [  ] Non verbal  REST OF REVIEW OF SYSTEMS: [  ] Normal     Vital Signs Last 24 Hrs  T(C): 36.8 (2022 06:32), Max: 36.8 (2022 06:32)  T(F): 98.3 (2022 06:32), Max: 98.3 (2022 06:32)  HR: 88 (2022 06:32) (88 - 91)  BP: 152/82 (2022 06:32) (128/74 - 152/82)  RR: 18 (2022 06:32) (15 - 18)  SpO2: 92% (2022 06:32) (92% - 98%)    CAPILLARY BLOOD GLUCOSE  POCT Blood Glucose.: 161 mg/dL (2022 07:26)  POCT Blood Glucose.: 152 mg/dL (2022 22:59)    I&O's Summary    PHYSICAL EXAM:  GENERAL:  [  ] NAD, [  ] Well appearing, [  ] Agitated, [  ] Drowsy, [  ] Lethargy, [  ] Confused   HEAD:  [  ] Normal, [  ] Other  EYES:  [  ] EOMI, [  ] PERRLA, [  ] Conjunctiva and sclera clear normal, [  ] Other, [  ] Pallor, [  ] Discharge  ENMT:  [  ] Normal, [  ] Moist mucous membranes, [  ] Good dentition, [  ] No thrush  NECK:  [  ] Supple, [  ] No JVD, [  ] Normal thyroid, [  ] Lymphadenopathy, [  ] Other  CHEST/LUNG:  [  ] Clear to auscultation bilaterally, [  ] Breath Sounds equal B/L / decreased, [  ] Poor effort, [  ] No rales, [  ] No rhonchi, [  ] No wheezing  HEART:  [  ] Regular rate and rhythm, [  ] Tachycardia, [  ] Bradycardia, [  ] Irregular, [  ] No murmurs, No rubs, No gallops, [  ] PPM in place (Mfr:  )  ABDOMEN:  [  ] Soft, [  ] Nontender, [  ] Nondistended, [  ] No mass, [  ] Bowel sounds present, [  ] Obese  NERVOUS SYSTEM:  [  ] Alert & Oriented x3, [  ] Nonfocal, [  ] Confusion, [  ] Encephalopathic, [  ] Sedated, [  ] Unable to assess, [  ] Dementia, [  ] Other-  EXTREMITIES:  [  ] 2+ Peripheral Pulses, No clubbing, No cyanosis,  [  ] Edema B/L lower EXT, [  ] PVD stasis skin changes B/L lower EXT, [  ] Wound  LYMPH:  No lymphadenopathy noted  SKIN:  [  ] No rashes or lesions, [  ] Pressure ulcers, [  ] Ecchymosis, [  ] Skin tears, [  ] Other    DIET: Diet, Consistent Carbohydrate/No Snacks:   DASH/TLC Sodium & Cholesterol Restricted  Easy to Chew (EASYTOCHEW) (22 @ 21:56)      LABS:                        12.9   20.34 )-----------( 480      ( 2022 05:23 )             39.5     2022 05:23    140    |  106    |  12     ----------------------------<  144    3.5     |  27     |  0.49     Ca    9.1        2022 05:23    TPro  7.0    /  Alb  2.6    /  TBili  0.4    /  DBili  x      /  AST  21     /  ALT  38     /  AlkPhos  117    2022 05:23    PT/INR - ( 2022 18:51 )   PT: 13.6 sec;   INR: 1.17 ratio    PTT - ( 2022 18:51 )  PTT:30.3 sec    Urinalysis Basic - ( 2022 21:29 )  Color: Pale Yellow / Appearance: Slightly Turbid / S.005 / pH: x  Gluc: x / Ketone: Negative  / Bili: Negative / Urobili: Negative   Blood: x / Protein: 15 / Nitrite: Negative   Leuk Esterase: Small / RBC: 0-2 /HPF / WBC 0-2   Sq Epi: x / Non Sq Epi: Occasional / Bacteria: Occasional    CARDIAC MARKERS ( 2022 18:51 )  x     / x     / 13 U/L / x     / x        RADIOLOGY & ADDITIONAL TESTS:  < from: Xray Chest 1 View- PORTABLE-Urgent (22 @ 18:23) >  Low lung volumes. No change heart mediastinum. Streaky   fibrotic/atelectaticchanges left base. No consolidation or effusion.   Chain sutures project over the right upper lobe. Clips right upper   quadrant.    IMPRESSION: No active infiltrates.    < end of copied text >    HEALTH ISSUES - PROBLEM Dx:  Cerebrovascular accident (CVA)    T2DM (type 2 diabetes mellitus)    HTN (hypertension)    Need for prophylactic measure    Asthma    GERD (gastroesophageal reflux disease)    Major depression    Weakness    2019 novel coronavirus disease (COVID-19)    Sacral pressure ulcer    Leukocytosis          Consultant(s) Notes Reviewed:  [  ] YES     Care Discussed with [ x ] Consultants, [  ] Patient, [  ] Family, [  ] HCP, [  ] , [  ] Social Service, [  ] RN, [  ] Physical Therapy, [  ] Palliative Care Team  DVT PPX: [  ] Lovenox, [  ] SC Heparin, [  ] Coumadin, [  ] Xarelto, [  ] Eliquis, [  ] Pradaxa, [  ] IV Heparin drip, [  ] SCD, [  ] Ambulation, [  ] Contraindicated 2/2 GI Bleed, [  ] Contraindicated 2/2  Bleed, [  ] Contraindicated 2/2 Brain Bleed  Advanced Directive: [  ] None, [  ] DNR/DNI Patient is a 66y old  Female who presents with a chief complaint of Infected sacral decubitus Ulcer, COVID (2022 19:54)  HPI: 64 y/o F PMH CVA (2021, w/ residual L sided weakness, on ASA, plavix), leukemia (unknown type), T2DM, HTN, asthma, GERD presenting with worsening sacral decubitus ulcer and weakness for 3 days. Daughter endorses patient has been acting "funny" for 3 days, much weaker, lethargic, less responsive, which prompted family to bring her in to the ED. Daughter also endorses erythema of sacral area for 3 weeks, now progressing to involve satellite lesions of upper posterior and anterior thigh. Patient denies pruritis, warmth, pain. Denies fevers, chills, abdominal pain, n/v/d/c at home. At baseline, pt is incontinent of urine, mostly bedbound due to residual CVA weakness, requires assistance in all ADLs. Of note, pt found to be COVID (+) in ED, and she received her Pfizer 2nd vaccine yesterday . Denies sx including fever, chills, SOB, cough that is changed from her baseline cough, sore throat. Daughter denies sick contacts at home, but her family works outside the home.     ED course:   Vitals 97.3, HR 91, 133/70, RR 15, 95% RA  Labs WBC 19, lactate 3.2, trop wnl, COVID (+)  Given: vanco 1g x1, aztreonam 1g x1, 2L NS bolus    INTERVAL HPI:  21- Pt seen and examined at bedside this AM. She reports feeling well, and her only symptom is itchiness around the rash she has on the left groin/upper thigh area. She denies any pain where her pressure injury is on her lower back. She had a stroke on 2021 which has left her with residual L-sided weakness, but she has an attendant at home to help her get around.     OVERNIGHT EVENTS: No acute overnight events.    Home Medications:  amLODIPine 2.5 mg oral tablet: 1 tab(s) orally once a day (2022 21:24)  Aspir 81 81 mg oral delayed release tablet: 1 tab(s) orally once a day (2022 21:24)  carvedilol 25 mg oral tablet: 1 tab(s) orally 2 times a day (2022 21:24)  clopidogrel 75 mg oral tablet: 1 tab(s) orally once a day (2022 21:24)  DULoxetine 30 mg oral delayed release capsule: 1 cap(s) orally once a day (at bedtime) (2022 21:24)  ezetimibe 10 mg oral tablet: 1 tab(s) orally once a day (at bedtime) (2022 21:24)  glipiZIDE 10 mg oral tablet: 1 tab(s) orally once a day (:24)  Jardiance 10 mg oral tablet: 1 tab(s) orally once a day (in the morning) (:24)  losartan 100 mg oral tablet: 1 tab(s) orally once a day (:24)  metformin 1000 mg oral tablet: 1 tab(s) orally 2 times a day (:24)  pantoprazole 40 mg oral delayed release tablet: 1 tab(s) orally once a day (2022 21:24)  Proventil HFA 90 mcg/inh inhalation aerosol: 2 puff(s) inhaled 4 times a day, As Needed (:24)  rosuvastatin 40 mg oral tablet: 1 tab(s) orally once a day (:24)  Vitamin D2 50,000 intl units (1.25 mg) oral capsule: 1 cap(s) orally once a week (2022 21:24)    MEDICATIONS  (STANDING):  amLODIPine   Tablet 2.5 milliGRAM(s) Oral daily  aspirin enteric coated 81 milliGRAM(s) Oral daily  atorvastatin 80 milliGRAM(s) Oral at bedtime  aztreonam  IVPB 1000 milliGRAM(s) IV Intermittent every 8 hours  carvedilol 25 milliGRAM(s) Oral every 12 hours  clopidogrel Tablet 75 milliGRAM(s) Oral daily  dextrose 40% Gel 15 Gram(s) Oral once  dextrose 5%. 1000 milliLiter(s) (50 mL/Hr) IV Continuous <Continuous>  dextrose 5%. 1000 milliLiter(s) (100 mL/Hr) IV Continuous <Continuous>  dextrose 50% Injectable 25 Gram(s) IV Push once  dextrose 50% Injectable 12.5 Gram(s) IV Push once  dextrose 50% Injectable 25 Gram(s) IV Push once  DULoxetine 30 milliGRAM(s) Oral daily  enoxaparin Injectable 40 milliGRAM(s) SubCutaneous daily  ergocalciferol 95930 Unit(s) Oral <User Schedule>  glucagon  Injectable 1 milliGRAM(s) IntraMuscular once  insulin lispro (ADMELOG) corrective regimen sliding scale   SubCutaneous three times a day before meals  insulin lispro (ADMELOG) corrective regimen sliding scale   SubCutaneous at bedtime  losartan 100 milliGRAM(s) Oral daily  nystatin Powder 1 Application(s) Topical three times a day  pantoprazole    Tablet 40 milliGRAM(s) Oral before breakfast  vancomycin  IVPB 1000 milliGRAM(s) IV Intermittent every 12 hours    MEDICATIONS  (PRN):  acetaminophen     Tablet .. 650 milliGRAM(s) Oral every 6 hours PRN Temp greater or equal to 38C (100.4F), Mild Pain (1 - 3)  ALBUTerol    90 MICROgram(s) HFA Inhaler 2 Puff(s) Inhalation every 6 hours PRN Shortness of Breath and/or Wheezing  aluminum hydroxide/magnesium hydroxide/simethicone Suspension 30 milliLiter(s) Oral every 4 hours PRN Dyspepsia  melatonin 3 milliGRAM(s) Oral at bedtime PRN Insomnia  ondansetron Injectable 4 milliGRAM(s) IV Push every 8 hours PRN Nausea and/or Vomiting    Allergies:  penicillins (Unknown)    Social History:  Tobacco: denies  EtOH: denies  Recreational drug use: denies  Lives with: daughter's family  Ambulates: mostly bedbound, but can move her LEs  ADLs: requires assistance  Vaccinations: Pfizer x2, most recent  (2022 19:54)    REVIEW OF SYSTEMS:  CONSTITUTIONAL: No fever, No chills, No fatigue, No myalgia, No Body ache, No Weakness  EYES: No eye pain,  No visual disturbances, No discharge, No Redness  ENMT: No ear pain, No nose bleed, No vertigo; No sinus pain, No throat pain, No Congestion  NECK: No pain, No stiffness  RESPIRATORY: No cough, No wheezing, No hemoptysis, No shortness of breath  CARDIOVASCULAR: No chest pain, No palpitations  GASTROINTESTINAL: No abdominal pain, No epigastric pain. No nausea, No vomiting, No diarrhea, No constipation; [  ] BM  GENITOURINARY: No dysuria, No frequency, No urgency, No hematuria, No incontinence  NEUROLOGICAL: No headaches, No dizziness, No numbness, No tingling, + Chronic stable left-sided weakness.  EXTREMITIES: No Swelling, No Pain, No Edema  SKIN: [ x ] Rash around L groin area that is itchy  MUSCULOSKELETAL: No joint pain, No joint swelling; No muscle pain, No back pain, No extremity pain  PSYCHIATRIC: No depression, No anxiety, No mood swings, No difficulty sleeping at night  PAIN SCALE: [ x ] None  [  ] Other-  ROS Unable to obtain due to: [  ] Dementia  [  ] Lethargy  [  ] Sedated  [  ] Non verbal  REST OF REVIEW OF SYSTEMS: [ x ] Normal     Vital Signs Last 24 Hrs  T(C): 36.8 (2022 06:32), Max: 36.8 (2022 06:32)  T(F): 98.3 (2022 06:32), Max: 98.3 (2022 06:32)  HR: 88 (2022 06:32) (88 - 91)  BP: 152/82 (2022 06:32) (128/74 - 152/82)  RR: 18 (2022 06:32) (15 - 18)  SpO2: 92% (2022 06:32) (92% - 98%)    CAPILLARY BLOOD GLUCOSE  POCT Blood Glucose.: 161 mg/dL (2022 07:26)  POCT Blood Glucose.: 152 mg/dL (2022 22:59)    I&O's Summary    PHYSICAL EXAM:  GENERAL:  [ x ] NAD, [ x ] Well appearing, [  ] Agitated, [  ] Drowsy, [  ] Lethargy, [  ] Confused   HEAD:  [ x ] Normal, [  ] Other  EYES:  [ x ] EOMI, [ x ] PERRLA, [ x ] Conjunctiva and sclera clear normal, [  ] Other, [  ] Pallor, [  ] Discharge  ENMT:  [ x ] Normal, [x  ] Moist mucous membranes, [  ] Good dentition, [  ] No thrush  NECK:  [ x ] Supple, [ x ] No JVD, [ x ] Normal thyroid, [  ] Lymphadenopathy, [  ] Other  CHEST/LUNG:  [ x ] Clear to auscultation bilaterally, [ x ] Breath Sounds equal B/L / decreased, [  ] Poor effort, [ x ] No rales, [ x ] No rhonchi, [ x ] No wheezing  HEART:  [ x ] Regular rate and rhythm, [  ] Tachycardia, [  ] Bradycardia, [  ] Irregular, [ x ] No murmurs, No rubs, No gallops, [  ] PPM in place (Mfr:  )  ABDOMEN:  [ x ] Soft, [ x ] Nontender, [ x ] Nondistended, [  ] No mass, [ x ] Bowel sounds present, [  ] Obese  NERVOUS SYSTEM:  [ x ] Alert & Oriented x3, [ x ] LUE and LLE weakness but antigravity on both extremities, [  ] Confusion, [  ] Encephalopathic, [  ] Sedated, [  ] Unable to assess, [  ] Dementia, [  ] Other-  EXTREMITIES:  [ x ] 2+ Peripheral Pulses, No clubbing, No cyanosis,  [  ] Edema B/L lower EXT, [  ] PVD stasis skin changes B/L lower EXT, [  ] Wound  LYMPH:  No lymphadenopathy noted  SKIN:  [ x ] Rash of anterior and posterior upper thigh/groin, [ x ] Sacral pressure wound, skin intact, [  ] Ecchymosis, [  ] Skin tears, [  ] Other    DIET: Diet, Consistent Carbohydrate/No Snacks:   DASH/TLC Sodium & Cholesterol Restricted  Easy to Chew (EASYTOCHEW) (22 @ 21:56)      LABS:                        12.9   20.34 )-----------( 480      ( 2022 05:23 )             39.5     2022 05:23    140    |  106    |  12     ----------------------------<  144    3.5     |  27     |  0.49     Ca    9.1        2022 05:23    TPro  7.0    /  Alb  2.6    /  TBili  0.4    /  DBili  x      /  AST  21     /  ALT  38     /  AlkPhos  117    2022 05:23    PT/INR - ( 2022 18:51 )   PT: 13.6 sec;   INR: 1.17 ratio    PTT - ( 2022 18:51 )  PTT:30.3 sec    Urinalysis Basic - ( 2022 21:29 )  Color: Pale Yellow / Appearance: Slightly Turbid / S.005 / pH: x  Gluc: x / Ketone: Negative  / Bili: Negative / Urobili: Negative   Blood: x / Protein: 15 / Nitrite: Negative   Leuk Esterase: Small / RBC: 0-2 /HPF / WBC 0-2   Sq Epi: x / Non Sq Epi: Occasional / Bacteria: Occasional    CARDIAC MARKERS ( 2022 18:51 )  x     / x     / 13 U/L / x     / x        RADIOLOGY & ADDITIONAL TESTS:  < from: Xray Chest 1 View- PORTABLE-Urgent (22 @ 18:23) >  Low lung volumes. No change heart mediastinum. Streaky   fibrotic/atelectaticchanges left base. No consolidation or effusion.   Chain sutures project over the right upper lobe. Clips right upper   quadrant.    IMPRESSION: No active infiltrates.    < end of copied text >    HEALTH ISSUES - PROBLEM Dx:  Cerebrovascular accident (CVA)  T2DM (type 2 diabetes mellitus)  HTN (hypertension)  Need for prophylactic measure  Asthma  GERD (gastroesophageal reflux disease)  Major depression  Weakness  2019 novel coronavirus disease (COVID-19)  Sacral pressure ulcer  Leukocytosis    Consultant(s) Notes Reviewed:  [ x ] YES     Care Discussed with [ x ] Consultants, [ x ] Patient, [  ] Family, [  ] HCP, [  ] , [  ] Social Service, [  ] RN, [  ] Physical Therapy, [  ] Palliative Care Team  DVT PPX: [ x ] Lovenox, [  ] SC Heparin, [  ] Coumadin, [  ] Xarelto, [  ] Eliquis, [  ] Pradaxa, [  ] IV Heparin drip, [  ] SCD, [  ] Ambulation, [  ] Contraindicated 2/2 GI Bleed, [  ] Contraindicated 2/2  Bleed, [  ] Contraindicated 2/2 Brain Bleed  Advanced Directive: [ x ] None, [  ] DNR/DNI Patient is a 66y old  Female who presents with a chief complaint of Infected sacral decubitus Ulcer, COVID (2022 19:54)  HPI: 64 y/o F PMH CVA (2021, w/ residual L sided weakness, on ASA, plavix), leukemia (unknown type), T2DM, HTN, asthma, GERD presenting with worsening sacral decubitus ulcer and weakness for 3 days. Daughter endorses patient has been acting "funny" for 3 days, much weaker, lethargic, less responsive, which prompted family to bring her in to the ED. Daughter also endorses erythema of sacral area for 3 weeks, now progressing to involve satellite lesions of upper posterior and anterior thigh. Patient denies pruritis, warmth, pain. Denies fevers, chills, abdominal pain, n/v/d/c at home. At baseline, pt is incontinent of urine, mostly bedbound due to residual CVA weakness, requires assistance in all ADLs. Of note, pt found to be COVID (+) in ED, and she received her Pfizer 2nd vaccine yesterday . Denies sx including fever, chills, SOB, cough that is changed from her baseline cough, sore throat. Daughter denies sick contacts at home, but her family works outside the home.     ED course:   Vitals 97.3, HR 91, 133/70, RR 15, 95% RA  Labs WBC 19, lactate 3.2, trop wnl, COVID (+)  Given: vanco 1g x1, aztreonam 1g x1, 2L NS bolus    INTERVAL HPI:  21- Pt seen and examined at bedside this AM. She reports feeling well, and her only symptom is itchiness around the rash she has on the left groin/upper thigh area. She denies any pain where her pressure injury is on her lower back. She had a stroke on 2021 which has left her with residual L-sided weakness, but she has an attendant at home to help her get around. WBC to its base line since 2 yrs, ? CLL STOP ABx as per ID, mAB RX for COVID + as per ID    OVERNIGHT EVENTS: No acute overnight events.    Home Medications:  amLODIPine 2.5 mg oral tablet: 1 tab(s) orally once a day (2022 21:24)  Aspir 81 81 mg oral delayed release tablet: 1 tab(s) orally once a day (2022 21:24)  carvedilol 25 mg oral tablet: 1 tab(s) orally 2 times a day (:24)  clopidogrel 75 mg oral tablet: 1 tab(s) orally once a day (2022 21:24)  DULoxetine 30 mg oral delayed release capsule: 1 cap(s) orally once a day (at bedtime) (2022 21:24)  ezetimibe 10 mg oral tablet: 1 tab(s) orally once a day (at bedtime) (:24)  glipiZIDE 10 mg oral tablet: 1 tab(s) orally once a day (:24)  Jardiance 10 mg oral tablet: 1 tab(s) orally once a day (in the morning) (:24)  losartan 100 mg oral tablet: 1 tab(s) orally once a day (:24)  metformin 1000 mg oral tablet: 1 tab(s) orally 2 times a day (:24)  pantoprazole 40 mg oral delayed release tablet: 1 tab(s) orally once a day (2022 21:24)  Proventil HFA 90 mcg/inh inhalation aerosol: 2 puff(s) inhaled 4 times a day, As Needed (:24)  rosuvastatin 40 mg oral tablet: 1 tab(s) orally once a day (2022 21:24)  Vitamin D2 50,000 intl units (1.25 mg) oral capsule: 1 cap(s) orally once a week (2022 21:24)    MEDICATIONS  (STANDING):  amLODIPine   Tablet 2.5 milliGRAM(s) Oral daily  aspirin enteric coated 81 milliGRAM(s) Oral daily  atorvastatin 80 milliGRAM(s) Oral at bedtime  aztreonam  IVPB 1000 milliGRAM(s) IV Intermittent every 8 hours  carvedilol 25 milliGRAM(s) Oral every 12 hours  clopidogrel Tablet 75 milliGRAM(s) Oral daily  dextrose 40% Gel 15 Gram(s) Oral once  dextrose 5%. 1000 milliLiter(s) (50 mL/Hr) IV Continuous <Continuous>  dextrose 5%. 1000 milliLiter(s) (100 mL/Hr) IV Continuous <Continuous>  dextrose 50% Injectable 25 Gram(s) IV Push once  dextrose 50% Injectable 12.5 Gram(s) IV Push once  dextrose 50% Injectable 25 Gram(s) IV Push once  DULoxetine 30 milliGRAM(s) Oral daily  enoxaparin Injectable 40 milliGRAM(s) SubCutaneous daily  ergocalciferol 39196 Unit(s) Oral <User Schedule>  glucagon  Injectable 1 milliGRAM(s) IntraMuscular once  insulin lispro (ADMELOG) corrective regimen sliding scale   SubCutaneous three times a day before meals  insulin lispro (ADMELOG) corrective regimen sliding scale   SubCutaneous at bedtime  losartan 100 milliGRAM(s) Oral daily  nystatin Powder 1 Application(s) Topical three times a day  pantoprazole    Tablet 40 milliGRAM(s) Oral before breakfast  vancomycin  IVPB 1000 milliGRAM(s) IV Intermittent every 12 hours    MEDICATIONS  (PRN):  acetaminophen     Tablet .. 650 milliGRAM(s) Oral every 6 hours PRN Temp greater or equal to 38C (100.4F), Mild Pain (1 - 3)  ALBUTerol    90 MICROgram(s) HFA Inhaler 2 Puff(s) Inhalation every 6 hours PRN Shortness of Breath and/or Wheezing  aluminum hydroxide/magnesium hydroxide/simethicone Suspension 30 milliLiter(s) Oral every 4 hours PRN Dyspepsia  melatonin 3 milliGRAM(s) Oral at bedtime PRN Insomnia  ondansetron Injectable 4 milliGRAM(s) IV Push every 8 hours PRN Nausea and/or Vomiting    Allergies:  penicillins (Unknown)    Social History:  Tobacco: denies  EtOH: denies  Recreational drug use: denies  Lives with: daughter's family  Ambulates: mostly bedbound, but can move her LEs  ADLs: requires assistance  Vaccinations: Pfizer x2, most recent  (2022 19:54)    REVIEW OF SYSTEMS: i am fine   CONSTITUTIONAL: No fever, No chills, No fatigue, No myalgia, No Body ache, No Weakness  EYES: No eye pain,  No visual disturbances, No discharge, No Redness  ENMT: No ear pain, No nose bleed, No vertigo; No sinus pain, No throat pain, No Congestion  NECK: No pain, No stiffness  RESPIRATORY: No cough, No wheezing, No hemoptysis, No shortness of breath  CARDIOVASCULAR: No chest pain, No palpitations  GASTROINTESTINAL: No abdominal pain, No epigastric pain. No nausea, No vomiting, No diarrhea, No constipation; [  ] BM  GENITOURINARY: No dysuria, No frequency, No urgency, No hematuria, No incontinence  NEUROLOGICAL: No headaches, No dizziness, No numbness, No tingling, + Chronic stable left-sided weakness.  EXTREMITIES: No Swelling, No Pain, No Edema  SKIN: [ x ] Rash around L groin area that is itchy  MUSCULOSKELETAL: No joint pain, No joint swelling; No muscle pain, No back pain, No extremity pain  PSYCHIATRIC: No depression, No anxiety, No mood swings, No difficulty sleeping at night  PAIN SCALE: [ x ] None  [  ] Other-  ROS Unable to obtain due to: [  ] Dementia  [  ] Lethargy  [  ] Sedated  [  ] Non verbal  REST OF REVIEW OF SYSTEMS: [ x ] Normal     Vital Signs Last 24 Hrs  T(C): 36.8 (2022 06:32), Max: 36.8 (2022 06:32)  T(F): 98.3 (2022 06:32), Max: 98.3 (2022 06:32)  HR: 88 (2022 06:32) (88 - 91)  BP: 152/82 (2022 06:32) (128/74 - 152/82)  RR: 18 (2022 06:32) (15 - 18)  SpO2: 92% (2022 06:32) (92% - 98%)    CAPILLARY BLOOD GLUCOSE  POCT Blood Glucose.: 161 mg/dL (2022 07:26)  POCT Blood Glucose.: 152 mg/dL (2022 22:59)    I&O's Summary    PHYSICAL EXAM:  GENERAL:  [ x ] NAD, [ x ] Well appearing, [  ] Agitated, [  ] Drowsy, [  ] Lethargy, [  ] Confused   HEAD:  [ x ] Normal, [  ] Other  EYES:  [ x ] EOMI, [ x ] PERRLA, [ x ] Conjunctiva and sclera clear normal, [  ] Other, [  ] Pallor, [  ] Discharge  ENMT:  [ x ] Normal, [x  ] Moist mucous membranes, [ x ] Good dentition, [x ] No thrush  NECK:  [ x ] Supple, [ x ] No JVD, [ x ] Normal thyroid, [  ] Lymphadenopathy, [  ] Other  CHEST/LUNG:  [ x ] Clear to auscultation bilaterally, [ x ] Breath Sounds equal B/L / decreased, [  ] Poor effort, [ x ] No rales, [ x ] No rhonchi, [ x ] No wheezing  HEART:  [ x ] Regular rate and rhythm, [  ] Tachycardia, [  ] Bradycardia, [  ] Irregular, [ x ] No murmurs, No rubs, No gallops, [  ] PPM in place (Mfr:  )  ABDOMEN:  [ x ] Soft, [ x ] Nontender, [ x ] Nondistended, [  ] No mass, [ x ] Bowel sounds present, [  ] Obese  NERVOUS SYSTEM:  [ x ] Alert & Oriented x3, [ x ] LUE and LLE weakness but antigravity on both extremities,- Old chronic  [  ] Confusion, [  ] Encephalopathic, [  ] Sedated, [  ] Unable to assess, [  ] Dementia, [  ] Other-  EXTREMITIES:  [ x ] 2+ Peripheral Pulses, No clubbing, No cyanosis,  [  ] Edema B/L lower EXT, [  ] PVD stasis skin changes B/L lower EXT, [  ] Wound  LYMPH:  No lymphadenopathy noted  SKIN:  [ x ] Rash of anterior and posterior upper thigh/groin, [ x ] Sacral pressure wound, skin intact, [  ] Ecchymosis, [  ] Skin tears, [  ] Other    DIET: Diet, Consistent Carbohydrate/No Snacks:   DASH/TLC Sodium & Cholesterol Restricted  Easy to Chew (EASYTOCHEW) (22 @ 21:56)      LABS:                        12.9   20.34 )-----------( 480      ( 2022 05:23 )             39.5     2022 05:23    140    |  106    |  12     ----------------------------<  144    3.5     |  27     |  0.49     Ca    9.1        2022 05:23    TPro  7.0    /  Alb  2.6    /  TBili  0.4    /  DBili  x      /  AST  21     /  ALT  38     /  AlkPhos  117    2022 05:23    PT/INR - ( 2022 18:51 )   PT: 13.6 sec;   INR: 1.17 ratio    PTT - ( 2022 18:51 )  PTT:30.3 sec    Urinalysis Basic - ( 2022 21:29 )  Color: Pale Yellow / Appearance: Slightly Turbid / S.005 / pH: x  Gluc: x / Ketone: Negative  / Bili: Negative / Urobili: Negative   Blood: x / Protein: 15 / Nitrite: Negative   Leuk Esterase: Small / RBC: 0-2 /HPF / WBC 0-2   Sq Epi: x / Non Sq Epi: Occasional / Bacteria: Occasional    CARDIAC MARKERS ( 2022 18:51 )  x     / x     / 13 U/L / x     / x        RADIOLOGY & ADDITIONAL TESTS:  < from: Xray Chest 1 View- PORTABLE-Urgent (22 @ 18:23) >  Low lung volumes. No change heart mediastinum. Streaky   fibrotic/atelectaticchanges left base. No consolidation or effusion.   Chain sutures project over the right upper lobe. Clips right upper   quadrant.    IMPRESSION: No active infiltrates.    < end of copied text >    HEALTH ISSUES - PROBLEM Dx:  Cerebrovascular accident (CVA)  T2DM (type 2 diabetes mellitus)  HTN (hypertension)  Need for prophylactic measure  Asthma  GERD (gastroesophageal reflux disease)  Major depression  Weakness  2019 novel coronavirus disease (COVID-19)  Sacral pressure ulcer  Leukocytosis    Consultant(s) Notes Reviewed:  [ x ] YES     Care Discussed with [ x ] Consultants, [ x ] Patient, [ x ] Family- dtr [  ] HCP, [ x ] , [  ] Social Service, [ x ] RN, [  ] Physical Therapy, [  ] Palliative Care Team  DVT PPX: [ x ] Lovenox, [  ] SC Heparin, [  ] Coumadin, [  ] Xarelto, [  ] Eliquis, [  ] Pradaxa, [  ] IV Heparin drip, [  ] SCD, [  ] Ambulation, [  ] Contraindicated 2/2 GI Bleed, [  ] Contraindicated 2/2  Bleed, [  ] Contraindicated 2/2 Brain Bleed  Advanced Directive: [ x ] None, [  ] DNR/DNI

## 2022-01-20 NOTE — DIETITIAN INITIAL EVALUATION ADULT. - EDUCATION DIETARY MODIFICATIONS
verbal review of cc diet only at this time with family on phone/(1) partially meets; needs review/practice

## 2022-01-20 NOTE — DIETITIAN INITIAL EVALUATION ADULT. - PROBLEM SELECTOR PLAN 4
CVA 1/2021 with residual L sided weakness  Continue home ASA, plavix  Lipitor as therapeutic interchange for home Crestor  Fall precautions  PT consult

## 2022-01-20 NOTE — DIETITIAN INITIAL EVALUATION ADULT. - PROBLEM SELECTOR PLAN 3
SPO2 ~95% on RA  Will defer steroids, antivirals for now as asymptomatic , RA O2 Sat- Nl   Supportive care  Monitor respiratory status  ID Dr. Matos consulted

## 2022-01-20 NOTE — PATIENT PROFILE ADULT - FALL HARM RISK - TYPE OF ASSESSMENT
Covering Dr. Mukherjee:    Patient seen, examined, and chart reviewed.  C/O of a full feeling after clears.  Agree with MORGAN Lobato's note and exam.  Although patient looks good clinically she has trepidation about advancing her diet to full liquids, perhaps tomorrow. Admission

## 2022-01-20 NOTE — CONSULT NOTE ADULT - SUBJECTIVE AND OBJECTIVE BOX
Patient is a 66y old  Female who presents with a chief complaint of Infected sacral decubitus Ulcer, COVID (2022 13:36)      Reason For Consult: Uncontrolled Type 2 Diabetes Mellitus    HPI:  62 y/o F PMH CVA (2021, w/ residual L sided weakness, on ASA, plavix), leukemia (unknown type), T2DM, HTN, asthma, GERD presenting with worsening sacral decubitus ulcer and weakness for 3 days. Daughter endorses patient has been acting "funny" for 3 days, much weaker, lethargic, less responsive, which prompted family to bring her in to the ED. Daughter also endorses erythema of sacral area for 3 weeks, now progressing to involve satellite lesions of upper posterior and anterior thigh. Patient denies pruritis, warmth, pain. Denies fevers, chills, abdominal pain, n/v/d/c at home. At baseline, pt is incontinent of urine, mostly bedbound due to residual CVA weakness, requires assistance in all ADLs. Of note, pt found to be COVID (+) in ED, and she received her Pfizer 2nd vaccine yesterday . Denies sx including fever, chills, SOB, cough that is changed from her baseline cough, sore throat. Daughter denies sick contacts at home, but her family works outside the home.     ED course:   Vitals 97.3, HR 91, 133/70, RR 15, 95% RA  Labs WBC 19, lactate 3.2, trop wnl, COVID (+)  Given: vanco 1g x1, aztreonam 1g x1, 2L NS bolus (2022 19:54)      PAST MEDICAL & SURGICAL HISTORY:  Asthma    HTN (hypertension)    Diabetes    Gastroesophageal reflux disease without esophagitis    Cerebrovascular accident (CVA)  residual L sided weakness    History of cholecystectomy    H/O  section    History of left cataract surgery        FAMILY HISTORY:  Family history of CVA (Mother)          Social History:   Tobacco: denies  EtOH: denies  Recreational drug use: denies  Lives with: daughter's family  Ambulates: mostly bedbound, but can move her LEs  ADLs: requires assistance  Vaccinations: Pfizer x2, most recent     MEDICATIONS  (STANDING):  amLODIPine   Tablet 2.5 milliGRAM(s) Oral daily  aspirin enteric coated 81 milliGRAM(s) Oral daily  atorvastatin 80 milliGRAM(s) Oral at bedtime  carvedilol 25 milliGRAM(s) Oral every 12 hours  clopidogrel Tablet 75 milliGRAM(s) Oral daily  dextrose 40% Gel 15 Gram(s) Oral once  dextrose 5%. 1000 milliLiter(s) (50 mL/Hr) IV Continuous <Continuous>  dextrose 5%. 1000 milliLiter(s) (100 mL/Hr) IV Continuous <Continuous>  dextrose 50% Injectable 25 Gram(s) IV Push once  dextrose 50% Injectable 12.5 Gram(s) IV Push once  dextrose 50% Injectable 25 Gram(s) IV Push once  DULoxetine 30 milliGRAM(s) Oral daily  enoxaparin Injectable 40 milliGRAM(s) SubCutaneous daily  ergocalciferol 72889 Unit(s) Oral <User Schedule>  glucagon  Injectable 1 milliGRAM(s) IntraMuscular once  insulin lispro (ADMELOG) corrective regimen sliding scale   SubCutaneous three times a day before meals  insulin lispro (ADMELOG) corrective regimen sliding scale   SubCutaneous at bedtime  losartan 100 milliGRAM(s) Oral daily  nystatin Powder 1 Application(s) Topical three times a day  pantoprazole    Tablet 40 milliGRAM(s) Oral before breakfast  sodium chloride 0.9%. 250 milliLiter(s) (100 mL/Hr) IV Continuous <Continuous>  sotrovimab (EUA) IVPB 500 milliGRAM(s) IV Intermittent once    MEDICATIONS  (PRN):  acetaminophen     Tablet .. 650 milliGRAM(s) Oral every 6 hours PRN Temp greater or equal to 38C (100.4F), Mild Pain (1 - 3)  ALBUTerol    90 MICROgram(s) HFA Inhaler 2 Puff(s) Inhalation every 6 hours PRN Shortness of Breath and/or Wheezing  aluminum hydroxide/magnesium hydroxide/simethicone Suspension 30 milliLiter(s) Oral every 4 hours PRN Dyspepsia  melatonin 3 milliGRAM(s) Oral at bedtime PRN Insomnia  ondansetron Injectable 4 milliGRAM(s) IV Push every 8 hours PRN Nausea and/or Vomiting        T(C): 36.9 (22 @ 13:55), Max: 36.9 (22 @ 13:55)  HR: 103 (22 @ 13:55) (88 - 103)  BP: 115/65 (22 @ 13:55) (115/65 - 152/82)  RR: 18 (22 @ 13:55) (18 - 18)  SpO2: 92% (22 @ 14:48) (89% - 98%)  Wt(kg): --    PHYSICAL EXAM:  GENERAL: NAD, well-groomed, well-developed, elderly  HEAD:  Atraumatic, Normocephalic  NECK: Supple, No JVD, Normal thyroid  CHEST/LUNG: Clear to percussion bilaterally; No rales, rhonchi, wheezing, or rubs  HEART: Regular rate and rhythm; No murmurs, rubs, or gallops  ABDOMEN: Soft, Nontender, Nondistended; Bowel sounds present  EXTREMITIES:  2+ Peripheral Pulses, No clubbing, cyanosis, or edema  SKIN: No rashes or lesions    CAPILLARY BLOOD GLUCOSE      POCT Blood Glucose.: 284 mg/dL (2022 16:28)  POCT Blood Glucose.: 213 mg/dL (2022 11:24)  POCT Blood Glucose.: 161 mg/dL (2022 07:26)  POCT Blood Glucose.: 152 mg/dL (2022 22:59)                            12.9   20.34 )-----------( 480      ( 2022 05:23 )             39.5       CMP:   @ 05:23  SGPT 38  Albumin 2.6   Alk Phos 117   Anion Gap 7   SGOT 21   Total Bili 0.4   BUN 12   Calcium Total 9.1   CO2 27   Chloride 106   Creatinine 0.49   eGFR if    eGFR if non    Glucose 144   Potassium 3.5   Protein 7.0   Sodium 140      Thyroid Function Tests:      Diabetes Tests:   A1c 12.5% on admission       Radiology:               
[facility]  INFECTIOUS DISEASES - [address], [place] [address]  Tel: [phone]     Fax: [phone]  =======================================================    [facility]-[id number]  ALESSANDRA MORFIN     CC: Patient is a 66y old  Female who presents with a chief complaint of Infected sacral decubitus Ulcer, COVID (2022 09:55)    HPI:  62 y/o F PMH CVA (2021, w/ residual L sided weakness, on ASA, plavix), leukemia, T2DM, asthma, who presented with worsening sacral decubitus ulcer and weakness for 3 days. Per records patient was weaker, lethargic and less responsive recently, which prompted family to bring her in to the ED. They also noted sacral erythema of sacral area for 3 weeks. Patient currently denies any cough, SOB. She denies any abdominal pain and denies any pain/pruritus on skin lesion. She wears diapers at home. She also received her Pfizer 2nd vaccine on . Afebrile here and on room air.      PAST MEDICAL & SURGICAL HISTORY:  Asthma    HTN (hypertension)    Diabetes    Gastroesophageal reflux disease without esophagitis    Cerebrovascular accident (CVA)  residual L sided weakness    History of cholecystectomy    H/O  section    History of left cataract surgery        Social Hx:     FAMILY HISTORY:  Family history of CVA (Mother)        Allergies    penicillins (facial itching)    Intolerances        Antibiotics:  MEDICATIONS  (STANDING):  amLODIPine   Tablet 2.5 milliGRAM(s) Oral daily  aspirin enteric coated 81 milliGRAM(s) Oral daily  atorvastatin 80 milliGRAM(s) Oral at bedtime  carvedilol 25 milliGRAM(s) Oral every 12 hours  clopidogrel Tablet 75 milliGRAM(s) Oral daily  dextrose 40% Gel 15 Gram(s) Oral once  dextrose 5%. 1000 milliLiter(s) (50 mL/Hr) IV Continuous <Continuous>  dextrose 5%. 1000 milliLiter(s) (100 mL/Hr) IV Continuous <Continuous>  dextrose 50% Injectable 25 Gram(s) IV Push once  dextrose 50% Injectable 12.5 Gram(s) IV Push once  dextrose 50% Injectable 25 Gram(s) IV Push once  DULoxetine 30 milliGRAM(s) Oral daily  enoxaparin Injectable 40 milliGRAM(s) SubCutaneous daily  ergocalciferol 81268 Unit(s) Oral <User Schedule>  glucagon  Injectable 1 milliGRAM(s) IntraMuscular once  insulin lispro (ADMELOG) corrective regimen sliding scale   SubCutaneous three times a day before meals  insulin lispro (ADMELOG) corrective regimen sliding scale   SubCutaneous at bedtime  losartan 100 milliGRAM(s) Oral daily  nystatin Powder 1 Application(s) Topical three times a day  pantoprazole    Tablet 40 milliGRAM(s) Oral before breakfast  potassium chloride    Tablet ER 40 milliEquivalent(s) Oral once    MEDICATIONS  (PRN):  acetaminophen     Tablet .. 650 milliGRAM(s) Oral every 6 hours PRN Temp greater or equal to 38C (100.4F), Mild Pain (1 - 3)  ALBUTerol    90 MICROgram(s) HFA Inhaler 2 Puff(s) Inhalation every 6 hours PRN Shortness of Breath and/or Wheezing  aluminum hydroxide/magnesium hydroxide/simethicone Suspension 30 milliLiter(s) Oral every 4 hours PRN Dyspepsia  melatonin 3 milliGRAM(s) Oral at bedtime PRN Insomnia  ondansetron Injectable 4 milliGRAM(s) IV Push every 8 hours PRN Nausea and/or Vomiting       REVIEW OF SYSTEMS:  CONSTITUTIONAL:  No Fever or chills  CARDIOVASCULAR:  No chest pain or SOB.  RESPIRATORY:  No cough, shortness of breath  GASTROINTESTINAL:  No nausea, vomiting or diarrhea. no abdominal pain  GENITOURINARY:  No dysuria  MUSCULOSKELETAL:  no back pain  NEUROLOGIC:  No headache, no dizziness  PSYCHIATRIC:  No disorder of thought or mood.    Physical Exam:  Vital Signs Last 24 Hrs  T(C): 36.8 (2022 06:32), Max: 36.8 (2022 06:32)  T(F): 98.3 (2022 06:32), Max: 98.3 (2022 06:32)  HR: 88 (2022 06:32) (88 - 91)  BP: 152/82 (2022 06:32) (128/74 - 152/82)  BP(mean): --  RR: 18 (2022 06:32) (15 - 18)  SpO2: 92% (2022 06:32) (92% - 98%)  Height (cm): 157.5 ( @ 16:28)  Weight (kg): 63.5 ( @ 16:28)  BMI (kg/m2): 25.6 ( @ 16:28)  BSA (m2): 1.64 ( @ 16:28)    GEN: NAD  HEENT: normocephalic and atraumatic.   NECK: Supple.    LUNGS: Normal respiratory effort  HEART: Regular rate and rhythm   ABDOMEN: Soft, nontender, and nondistended.    EXTREMITIES: No leg edema.  NEUROLOGIC: answering simple questions appropriately, following commands  PSYCHIATRIC: Appropriate affect .  SKIN: (+) erythema on bilateral inner gluteal area, but no ulcer or draining wounds, no fluctuance or tenderness    Labs:      140  |  106  |  12  ----------------------------<  144<H>  3.5   |  27  |  0.49<L>    Ca    9.1      2022 05:23    TPro  7.0  /  Alb  2.6<L>  /  TBili  0.4  /  DBili  x   /  AST  21  /  ALT  38  /  AlkPhos  117                            12.9   20.34 )-----------( 480      ( 2022 05:23 )             39.5     PT/INR - ( 2022 18:51 )   PT: 13.6 sec;   INR: 1.17 ratio         PTT - ( 2022 18:51 )  PTT:30.3 sec  Urinalysis Basic - ( 2022 21:29 )    Color: Pale Yellow / Appearance: Slightly Turbid / S.005 / pH: x  Gluc: x / Ketone: Negative  / Bili: Negative / Urobili: Negative   Blood: x / Protein: 15 / Nitrite: Negative   Leuk Esterase: Small / RBC: 0-2 /HPF / WBC 0-2   Sq Epi: x / Non Sq Epi: Occasional / Bacteria: Occasional      LIVER FUNCTIONS - ( 2022 05:23 )  Alb: 2.6 g/dL / Pro: 7.0 g/dL / ALK PHOS: 117 U/L / ALT: 38 U/L / AST: 21 U/L / GGT: x           CARDIAC MARKERS ( 2022 18:51 )  x     / x     / 13 U/L / x     / x              C-Reactive Protein, Serum: 16 mg/L (22 @ 09:51)    Sedimentation Rate, Erythrocyte: 40 mm/hr (22 @ 05:23)      SARS-CoV-2 Result: Detected (22 @ 18:51)      RECENT CULTURES:        All imaging and other data have been reviewed.      CXR:  Low lung volumes. No change heart mediastinum. Streaky   fibrotic/atelectatic changes left base. No consolidation or effusion.   Chain sutures project over the right upper lobe. Clips right upper   quadrant.    IMPRESSION: No active infiltrates.

## 2022-01-20 NOTE — DISCHARGE NOTE PROVIDER - CARE PROVIDER_API CALL
JENNIFER NOONAN  Internal Medicine  [address] [place] [address]  Phone: [phone]  Fax: [phone])-  Follow Up Time: 1 week   JENNIFER NOONAN  Internal Medicine  [address] [address]  Phone: ()-  Fax: ()-  Follow Up Time: 1 week    Perlman, Craig D  [address], [address]  [place] [address]  Phone: [phone]  Fax: [phone]  Follow Up Time:

## 2022-01-20 NOTE — DIETITIAN INITIAL EVALUATION ADULT. - DIET TYPE
RD spoke with MD to change to regular consistency diet patient on regular solids PTA not eating modified consistency foods/consistent carbohydrate (no snacks)/DASH/TLC (sodium and cholesterol restricted diet)/supplement (specify)

## 2022-01-20 NOTE — DISCHARGE NOTE PROVIDER - NSDCFUADDINST_GEN_ALL_CORE_FT
Follow up with DR C Perlman in 2 weeks for  Lantus adjustment , Check FSBS & document   Follow up with PMD in 2-3 weeks

## 2022-01-20 NOTE — PROGRESS NOTE ADULT - PROBLEM SELECTOR PLAN 6
Likely due to  COVID ,  sacral decubitus ulcer  WBC 19, lactate 3.2 on admission  Antibiotics as above  Blood cultures, urine cultures pending, f/u results  Fall precautions  Monitor for fevers  ID Dr. Matos consulted Likely due to COVID, sacral decubitus ulcer  WBC 19, lactate 3.2 on admission  Antibiotics as above  Blood cultures, urine cultures pending, f/u results  Fall precautions  Monitor for fevers  ID Dr. Matos consulted Glucose in 300s on admission  Hold home metformin & jardiance while inpatient  Moderate dose insulin corrective scale   A1C 12.5%  Consistent carb diet Glucose in 300s on admission  Hold home metformin & jardiance while inpatient  Moderate dose insulin corrective scale   A1C 12.5%  Consistent carb diet  Endocrine consult- Dr. Perlman Glucose in 300s on admission  Hold home metformin & jardiance while inpatient  Moderate dose insulin corrective scale   A1C 12.5%  Consistent carb diet  Endocrine consult- Dr. Perlman C

## 2022-01-20 NOTE — DIETITIAN INITIAL EVALUATION ADULT. - OTHER INFO
66 year old female Dx sacral decubiti COVID 19 + PMH  CVA GERD HTN DM type 2 asthma  patient with weight loss reported over last year per family wt now 140# noted admit 2016 wt 148#   food preferences noted per family  patient takes [id number] and glipizide PTA per family is compliant with medications  no food allergies

## 2022-01-20 NOTE — DIETITIAN INITIAL EVALUATION ADULT. - ORAL INTAKE PTA/DIET HISTORY
per family patient with good PO PTA . patient has lost weight since stoke 1 year ago.  patient checks blood sugars maybe once a day but not consistently.   NCS diet otherwise avoids to much rice /pasta per family  patient with food from home at bedside bites of lunch from hospital taken. patient seen sitting in chair  RD spoke to MD to order endo and CDE consult with A1c 12.5% (RD discussed with family at this time)

## 2022-01-20 NOTE — CONSULT NOTE ADULT - ASSESSMENT
62 y/o F PMH CVA (1/2021, w/ residual L sided weakness, on ASA, plavix), leukemia (unknown type), T2DM, HTN, asthma, GERD presenting with weakness for 3 days, leukocytosis , sacral pressure ulcer & fungal rash, + COVID. Endocrine consult for glucose control.    Assessment and Plan:  Patient has 64 y/o F PMH CVA (1/2021, w/ residual L sided weakness, on ASA, plavix), leukemia (unknown type), T2DM, HTN, asthma, GERD presenting with weakness for 3 days, leukocytosis , sacral pressure ulcer & fungal rash, + COVID. Endocrine consult for glucose control, A1c 12.5% on admission. Pt previously insulin dependent Type 2 Diabetic however recently managed with PO medications and diet. As per daughter Stacy, pt's diet at home is very poor and pt measures gluc levels 1-2x per day. Patient PCP has been adjusting outpatient PO meds as last A1C in 11/2021 noted to be ~11. Patient previously on Lantus 10 units before stroke in 1/2021.     Plan:  - Will start Lantus 10 units QHS given AM gluc elevated, patient with history of better controlled diabetes with lantus 10units QHS.  - Continue consistent carb diet with glucerna cans x2 BID   - Continue  to hold home po diabetic medications  - Continue moderate dose correctional insulin scale    - Goal blood glucose in hospital setting 100-180, will adjust insulin regimen PRN  - Hypoglycemia protocol in place, monitor for signs of hypoglycemia

## 2022-01-20 NOTE — DIETITIAN INITIAL EVALUATION ADULT. - PROBLEM SELECTOR PLAN 7
Chronic, well controlled  Continue home antihypertensive regimen: coreg, amlodipine, losartan  Routine hemodynamic monitoring

## 2022-01-20 NOTE — DIETITIAN INITIAL EVALUATION ADULT. - ADD RECOMMEND
1. recommend MVI and Vit C 500mg BID 2. recommend diet and supplement as above 3. spoke with MD to order endocrine and CDE consult with elevated A1c .

## 2022-01-20 NOTE — DIETITIAN INITIAL EVALUATION ADULT. - PROBLEM SELECTOR PLAN 1
Patient is mostly bedbound after 2021 CVA, also incontinent of urine with diaper Rash/Fungal Rash  WBC 19, lactate 3.2 on admission  Given in ED vancomycin 1g x1, aztreonam 1g x1 (allergic to penicillin)  Continue vancomycin, aztreonam while inpatient  Check MRSA PCR  Check ESR, CRP  F/u blood cultures  Monitor for fevers  q2 turning  Day team to consult wound care   ID Dr. Matos consulted

## 2022-01-20 NOTE — PROGRESS NOTE ADULT - PROBLEM SELECTOR PLAN 2
-Pt with leukocytosis ? etiology , afebrile  Daughter endorses patient has history of leukemia, but cannot recall which type  As per daughter, patient is in remission, not on chemotherapy  CBC in AM   Abx to continue -Pt with leukocytosis of unknown etiology, afebrile; appears to be chronic upon review of HIE  Daughter endorses patient has history of leukemia, but cannot recall which type  As per daughter, patient is in remission, not on chemotherapy  CBC in AM- leukocytosis appears to have lymphocytic predominance  Continue abx -Pt with leukocytosis of unknown etiology, afebrile; appears to be chronic upon review of HIE  Daughter endorses patient has history of ?? leukemia,/ CLL  but cannot recall which type  As per daughter, patient is in remission, not on chemotherapy, slow progress, q 3 months follow up with H/O [facility]   CBC in AM- leukocytosis appears to have lymphocytic predominance  STOP Abx as WBC to base line & sacral Pressure ulcer is NOT Infected, as per ID Dr Perdomo D/W

## 2022-01-20 NOTE — DISCHARGE NOTE PROVIDER - NSDCCPCAREPLAN_GEN_ALL_CORE_FT
PRINCIPAL DISCHARGE DIAGNOSIS  Diagnosis: Sacral decubitus ulcer  Assessment and Plan of Treatment: You were found to have a pressure injury on your lower back. We called the infectious disease doctor to evaluate it, and it was not found to be infected, and you did not need any antibiotics to treat it.   - Follow up with your primary care in 1 week for further monitoring of this wound  - At home, try to turn in your bed every few hours to relieve the pressure on the area      SECONDARY DISCHARGE DIAGNOSES  Diagnosis: Leukocytosis, unspecified type  Assessment and Plan of Treatment: You were found to have an elevated white blood cell count on admission, which was initially thought to be a sign of infection. However, you have had an elevated white blood cell count chronically, so we believe this is due to your leukemia diagnosis.  - Follow up with your oncologist at [facility] to further manage and treat this condition if needed.  - Please reach out to your oncologist and try to obtain the specific leukemia diagnosis.    Diagnosis: Diaper candidiasis  Assessment and Plan of Treatment: You had a rash around your groin area due to a fungal infection. We treated you with an antifungal called Nystatin while you were here. On discharge:  - START using clotrimazole cream twice a day for 2-4 weeks. If your rash goes away in 2 weeks, you can discontinue using the cream  - Do NOT use diapers at home, as these can cause new or worsening fungal infections    Diagnosis: 2019 novel coronavirus disease (COVID-19)  Assessment and Plan of Treatment: You were incidentally found to have a COVID infection on admission, which we thing you obtained from an infected household contact. We think that this may have caused your weakness and fatigue that brought you to the hospital.  - You were treated with monoclonal antibodies while you were here.  - Follow up with your primary care doctor in 1 week for further monitoring  - Schedule your COVID-19 booster vaccine 6 months after the date of your second dose    Diagnosis: T2DM (type 2 diabetes mellitus)  Assessment and Plan of Treatment: Your A1C, which is a marker of how well your diabetes is controlled, was very elevated at 12.5%. This is a sign of uncontrolled diabetes, and can lead to nerve, kidney, and eye problems if left uncontrolled.  - [facility] 1 week after discharge for further recommendations and medication adjustments.  - For now, continue taking your home diabetes medications (metformin, glipizine, and jardiance) as prescribed.     PRINCIPAL DISCHARGE DIAGNOSIS  Diagnosis: Sacral decubitus ulcer  Assessment and Plan of Treatment: You were found to have a pressure injury on your lower back. We called the infectious disease doctor to evaluate it, and it was not found to be infected, and you did not need any antibiotics to treat it.   - Follow up with your primary care in 1 week for further monitoring of this wound  - At home, try to turn in your bed every few hours to relieve the pressure on the area      SECONDARY DISCHARGE DIAGNOSES  Diagnosis: Leukocytosis, unspecified type  Assessment and Plan of Treatment: You were found to have an elevated white blood cell count on admission, which was initially thought to be a sign of infection. However, you have had an elevated white blood cell count chronically, so we believe this is due to your leukemia diagnosis.  - Follow up with your oncologist at [facility] to further manage and treat this condition if needed.  - Please reach out to your oncologist and try to obtain the specific leukemia diagnosis.    Diagnosis: 2019 novel coronavirus disease (COVID-19)  Assessment and Plan of Treatment: You were incidentally found to have a COVID infection on admission, which we thing you obtained from an infected household contact. We think that this may have caused your weakness and fatigue that brought you to the hospital.  - You were treated with monoclonal antibodies while you were here.  - Follow up with your primary care doctor in 1 week for further monitoring  - Schedule your COVID-19 booster vaccine 6 months after the date of your second dose    Diagnosis: Diaper candidiasis  Assessment and Plan of Treatment: You had a rash around your groin area due to a fungal infection. We treated you with an antifungal called Nystatin while you were here. On discharge:  - START using clotrimazole cream twice a day for 2-4 weeks. If your rash goes away in 2 weeks, you can discontinue using the cream  - Do NOT use diapers at home, as these can cause new or worsening fungal infections    Diagnosis: T2DM (type 2 diabetes mellitus)  Assessment and Plan of Treatment: Your A1C, which is a marker of how well your diabetes is controlled, was very elevated at 12.5%. This is a sign of uncontrolled diabetes, and can lead to nerve, kidney, and eye problems if left uncontrolled.  - For now, continue taking your home diabetes medications (metformin, glipizine, and jardiance) as prescribed. START 10 units of lantus every day  - Follow up with yout primary care doctor 1 week after discharge for further recommendations and medication adjustments.       PRINCIPAL DISCHARGE DIAGNOSIS  Diagnosis: 2019 novel coronavirus disease (COVID-19)  Assessment and Plan of Treatment: You were incidentally found to have a COVID infection on admission, which we thing you obtained from an infected household contact. We think that this may have caused your weakness and fatigue that brought you to the hospital.  - You were treated with monoclonal antibodies while you were here.  - Follow up with your primary care doctor in 1 week for further monitoring  - Schedule your COVID-19 booster vaccine  6 months after the date of your second dose      SECONDARY DISCHARGE DIAGNOSES  Diagnosis: Leukocytosis, unspecified type  Assessment and Plan of Treatment: You were found to have an elevated white blood cell count on admission,   You have H/O CLL However, you have had an elevated white blood cell count chronically,   - Follow up with your oncologist at [facility] to further manage and treat this condition if needed.  - Please reach out to your oncologist and try to obtain the specific leukemia diagnosis.    Diagnosis: T2DM (type 2 diabetes mellitus)  Assessment and Plan of Treatment: Your A1C, which is a marker of how well your diabetes is controlled, was very elevated at 12.5%. This is a sign of uncontrolled diabetes, and can lead to nerve, kidney, and eye problems if left uncontrolled.  - For now, continue taking your home diabetes medications (metformin, glipizine, and jardiance) as prescribed.   -START Insulin inj  Subcutaneously  10 units of lantus every Night . Monitor FSBS on regular bases  - Follow up with DR C perlman -Endocrinologist  1- 2  week after discharge for further recommendations and medication adjustments.      Diagnosis: Sacral decubitus ulcer  Assessment and Plan of Treatment: You were found to have a pressure injury on your Sacral area- stage 2 . We called the infectious disease doctor to evaluate it, and it was not found to be infected, and you did not need any antibiotics to treat it.   -You have Fungal diaper Rash -apply cream as directed, STOP using Diaper.  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 We treated you with an antifungal called Nystatin while you were here. On discharge:  - START using clotrimazole cream twice a day for 2-4 weeks. If your rash goes away in 2 weeks, you can discontinue using the cream  - Do NOT use diapers at home, as these can cause new or worsening fungal infections    Diagnosis: Major depression  Assessment and Plan of Treatment: On Duloxetine daily    Diagnosis: GERD (gastroesophageal reflux disease)  Assessment and Plan of Treatment: On Protonix  daily

## 2022-01-20 NOTE — PROGRESS NOTE ADULT - ATTENDING COMMENTS
64 y/o F PMH CVA (1/2021, w/ residual L sided weakness, on ASA, plavix), leukemia (unknown type), T2DM, HTN, asthma, GERD presenting with weakness for 3 days, leukocytosis , sacral pressure ulcer & fungal rash, + COVID   Pt seen, examined, case & care plan d/w pt, residents at detail.  AM Labs   PT Eval---> HCPT  PO diet  ID -DR Perdomo d/w at detail, plan for mAB RX   Skin Care as per RN  D/W Dtr at very Detail.  D/W Case management & Dietary  ENDO-Dr C perlman called.

## 2022-01-20 NOTE — DISCHARGE NOTE PROVIDER - HOSPITAL COURSE
HPI:  64 y/o F PMH CVA (1/2021, w/ residual L sided weakness, on ASA, plavix), leukemia (unknown type), T2DM, HTN, asthma, GERD presenting with worsening sacral decubitus ulcer and weakness for 3 days. Daughter endorses patient has been acting "funny" for 3 days, much weaker, lethargic, less responsive, which prompted family to bring her in to the ED. Daughter also endorses erythema of sacral area for 3 weeks, now progressing to involve satellite lesions of upper posterior and anterior thigh. Patient denies pruritis, warmth, pain. Denies fevers, chills, abdominal pain, n/v/d/c at home. At baseline, pt is incontinent of urine, mostly bedbound due to residual CVA weakness, requires assistance in all ADLs. Of note, pt found to be COVID (+) in ED, and she received her Pfizer 2nd vaccine yesterday 1/18. Denies sx including fever, chills, SOB, cough that is changed from her baseline cough, sore throat. Daughter denies sick contacts at home, but her family works outside the home.     ED course:   Vitals 97.3, HR 91, 133/70, RR 15, 95% RA  Labs WBC 19, lactate 3.2, trop wnl, COVID (+)  Given: vanco 1g x1, aztreonam 1g x1, 2L NS bolus (19 Jan 2022 19:54)      ---  HOSPITAL COURSE:   Patient presented with weakness and admitted to general medical floor for sacral pressure ulcer, fungal rash, and +COVID. Patient initially started on IV Aztreonam and IV Vancomycin, ID consulted. Pressure ulcer appeared intact, patient without fever so ABX held. Weakness likely due to covid. Patient with hx of leukemia, unknown type. Patient leukocytosis remained at baseline (~20). Patient was not hypoxemic and did not need oxygen, steroids and anti-virals were held. Patient also with PMH DM, HgbA1c resulted 12.5. Patient started on mod-dose ISS, endo consulted. PT consulted, rec home w/ home PT.    Patient seen and examined day of discharge, patient feeling well, denies any complaints **  Vitals  Physical Exam    Patient optimized from medical standpoint for discharge ****** with close outpatient follow up.    ---  CONSULTANTS:   ID: Dr. Matos  Endo: Dr. Perlman    ---  TIME SPENT:  I, the attending physician, was physically present for the key portions of the evaluation and management (E/M) service provided. The total amount of time spent reviewing the hospital notes, laboratory values, imaging findings, assessing/counseling the patient, discussing with consultant physicians, social work, nursing staff was -- minutes    ---   HPI:  64 y/o F PMH CVA (1/2021, w/ residual L sided weakness, on ASA, plavix), leukemia (unknown type), T2DM, HTN, asthma, GERD presenting with worsening sacral decubitus ulcer and weakness for 3 days. Daughter endorses patient has been acting "funny" for 3 days, much weaker, lethargic, less responsive, which prompted family to bring her in to the ED. Daughter also endorses erythema of sacral area for 3 weeks, now progressing to involve satellite lesions of upper posterior and anterior thigh. Patient denies pruritis, warmth, pain. Denies fevers, chills, abdominal pain, n/v/d/c at home. At baseline, pt is incontinent of urine, mostly bedbound due to residual CVA weakness, requires assistance in all ADLs. Of note, pt found to be COVID (+) in ED, and she received her Pfizer 2nd vaccine yesterday 1/18. Denies sx including fever, chills, SOB, cough that is changed from her baseline cough, sore throat. Daughter denies sick contacts at home, but her family works outside the home.     ED course:   Vitals 97.3, HR 91, 133/70, RR 15, 95% RA  Labs WBC 19, lactate 3.2, trop wnl, COVID (+)  Given: vanco 1g x1, aztreonam 1g x1, 2L NS bolus (19 Jan 2022 19:54)    ---  HOSPITAL COURSE:   Patient presented with weakness and admitted to general medical floor for sacral pressure ulcer, fungal rash, and +COVID. Patient initially started on IV Aztreonam and IV Vancomycin, ID consulted. Pressure ulcer appeared intact, stage II, patient without fever so ABX held. Weakness likely due to covid. Patient with hx of leukemia/CLL, unknown type. Patient leukocytosis remained at baseline and had a lymphocytic predominance (~20). Patient was not hypoxemic and did not need oxygen, steroids and anti-virals were held. Pt received monoclonal antibody infusion on 1/20/22. Patient also with PMH DM, HgbA1c resulted 12.5. Patient started on mod-dose ISS, endo consulted, added Lantus at bedtime. PT consulted, rec home w/ home PT.    Patient seen and examined day of discharge, patient feeling well, denies any complaints.    Vital Signs Last 24 Hrs  T(C): 36.5 (21 Jan 2022 04:29), Max: 36.9 (20 Jan 2022 13:55)  T(F): 97.7 (21 Jan 2022 04:29), Max: 98.4 (20 Jan 2022 13:55)  HR: 78 (21 Jan 2022 04:29) (78 - 103)  BP: 163/80 (21 Jan 2022 04:29) (115/65 - 163/80)  RR: 17 (21 Jan 2022 04:29) (17 - 18)  SpO2: 95% (21 Jan 2022 04:29) (89% - 95%)    PHYSICAL EXAM:  GENERAL:  [ x ] NAD, [ x ] Well appearing, [  ] Agitated, [  ] Drowsy, [  ] Lethargy, [  ] Confused   HEAD:  [ x ] Normal, [  ] Other  EYES:  [ x ] EOMI, [ x ] PERRLA, [ x ] Conjunctiva and sclera clear normal, [  ] Other, [  ] Pallor, [  ] Discharge  ENMT:  [ x ] Normal, [x  ] Moist mucous membranes, [ x ] Good dentition, [x ] No thrush  NECK:  [ x ] Supple, [ x ] No JVD, [ x ] Normal thyroid, [  ] Lymphadenopathy, [  ] Other  CHEST/LUNG:  [ x ] Clear to auscultation bilaterally, [ x ] Breath Sounds equal B/L / decreased, [  ] Poor effort, [ x ] No rales, [ x ] No rhonchi, [ x ] No wheezing  HEART:  [ x ] Regular rate and rhythm, [  ] Tachycardia, [  ] Bradycardia, [  ] Irregular, [ x ] No murmurs, No rubs, No gallops, [  ] PPM in place (Mfr:  )  ABDOMEN:  [ x ] Soft, [ x ] Nontender, [ x ] Nondistended, [  ] No mass, [ x ] Bowel sounds present, [  ] Obese  NERVOUS SYSTEM:  [ x ] Alert & Oriented x3, [ x ] LUE and LLE weakness but antigravity on both extremities,- Old chronic  [  ] Confusion, [  ] Encephalopathic, [  ] Sedated, [  ] Unable to assess, [  ] Dementia, [  ] Other-  EXTREMITIES:  [ x ] 2+ Peripheral Pulses, No clubbing, No cyanosis,  [  ] Edema B/L lower EXT, [  ] PVD stasis skin changes B/L lower EXT, [  ] Wound  LYMPH:  No lymphadenopathy noted  SKIN:  [ x ] Rash of anterior and posterior upper thigh/groin, [ x ] Sacral pressure wound, 1 x 0.3 cm, Stage II, [  ] Ecchymosis, [  ] Skin tears, [  ] Other  Patient optimized from medical standpoint for discharge ****** with close outpatient follow up.    ---  CONSULTANTS:   ID: Dr. Matos  Endo: Dr. Perlman    ---  TIME SPENT:  I, the attending physician, was physically present for the key portions of the evaluation and management (E/M) service provided. The total amount of time spent reviewing the hospital notes, laboratory values, imaging findings, assessing/counseling the patient, discussing with consultant physicians, social work, nursing staff was -- minutes    ---   HPI:  62 y/o F PMH CVA (1/2021, w/ residual L sided weakness, on ASA, plavix),CLL with Leukocytosis , T2DM, HTN, asthma, GERD presenting with worsening sacral decubitus ulcer and weakness for 3 days. Daughter endorses patient has been acting "funny" for 3 days, much weaker, lethargic, less responsive, which prompted family to bring her in to the ED. Daughter also endorses erythema of sacral area for 3 weeks, now progressing to involve satellite lesions of upper posterior and anterior thigh. Patient denies pruritis, warmth, pain. Denies fevers, chills, abdominal pain, n/v/d/c at home. At baseline, pt is incontinent of urine, mostly bedbound due to residual CVA weakness, requires assistance in all ADLs. Of note, pt found to be COVID (+) in ED, and she received her Pfizer 2nd vaccine yesterday 1/18. Denies sx including fever, chills, SOB, cough that is changed from her baseline cough, sore throat. Daughter denies sick contacts at home, but her family works outside the home.     ED course:   Vitals 97.3, HR 91, 133/70, RR 15, 95% RA  Labs WBC 19, lactate 3.2, trop wnl, COVID (+)  Given: vanco 1g x1, aztreonam 1g x1, 2L NS bolus (19 Jan 2022 19:54)    ---  HOSPITAL COURSE:   Patient presented with weakness and admitted to general medical floor for sacral pressure ulcer, fungal rash, and +COVID. Patient initially started on IV Aztreonam and IV Vancomycin, ID  Dr Matos/DR Perdomo consulted. Pressure ulcer appeared intact, stage II, patient without fever so ABX held. Weakness likely due to covid. Patient with hx of CLL, unknown type. Patient leukocytosis remained at baseline and had a lymphocytic predominance (~20). Patient was not hypoxemic and did not need oxygen, steroids and anti-virals were held. Pt received monoclonal antibody infusion on 1/20/22. Patient also with PMH DM, HgbA1c resulted 12.5. Patient started on mod-dose ISS, endo consulted, added Lantus 10 units SC  at bedtime. PT consulted, rec home w/ home PT. Pt has fungal diaper Rash, antifungal Rx started, Pt stable for d/c home with out pt follow up with all home meds.    Patient seen and examined day of discharge, patient feeling well, denies any complaints.    ---  CONSULTANTS:   ID: Dr. Matos  Endo: Dr. Perlman    ---  TIME SPENT:  I, the attending physician, was physically present for the key portions of the evaluation and management (E/M) service provided. The total amount of time spent reviewing the hospital notes, laboratory values, imaging findings, assessing/counseling the patient, discussing with consultant physicians, social work, nursing staff was 60 minutes    ---

## 2022-01-20 NOTE — DIETITIAN INITIAL EVALUATION ADULT. - PROBLEM SELECTOR PLAN 2
-Pt with leukocytosis ? etiology , afebrile  Daughter endorses patient has history of leukemia, but cannot recall which type  As per daughter, patient is in remission, not on chemotherapy  CBC in AM   Abx to continue

## 2022-01-20 NOTE — PROGRESS NOTE ADULT - PROBLEM SELECTOR PLAN 1
Patient is mostly bedbound after 2021 CVA, also incontinent of urine with diaper Rash/Fungal Rash  WBC 19, lactate 3.2 on admission  Given in ED vancomycin 1g x1, aztreonam 1g x1 (allergic to penicillin)  Continue vancomycin, aztreonam while inpatient  Check MRSA PCR  Check ESR, CRP  F/u blood cultures  Monitor for fevers  q2 turning  Day team to consult wound care   ID Dr. Matos consulted Patient is mostly bedbound after 2021 CVA, also incontinent of urine with diaper Rash/Fungal Rash  WBC 19, lactate 3.2 on admission, now lactate of 1.1  Given in ED vancomycin 1g x1, aztreonam 1g x1 (allergic to penicillin)  Continue vancomycin, aztreonam while inpatient  F/u MRSA PCR  ESR elevated at 40, f/u CRP  F/u blood cultures  Monitor for fevers  q2 turning  Considering wound care consult pending clinical course  ID Dr. Matos consulted Patient is mostly bedbound after 2021 CVA, also incontinent of urine with diaper Rash/Fungal Rash  WBC 19 (chronic), lactate 3.2 on admission, now lactate of 1.1, possibly elevated 2/2 metformin use  Given in ED vancomycin 1g x1, aztreonam 1g x1 (allergic to penicillin)  Discontinued vancomycin, aztreonam as per ID  F/u MRSA PCR  ESR elevated at 40, f/u CRP  F/u blood cultures  Monitor for fevers  q2 turning  No wound care consult at this time as no skin breakdown noted by primary team, ID or nursing staff  ID Dr. Matos consulted, appreciate recs Patient is mostly bedbound after 2021 CVA, also incontinent of urine with diaper Rash/Fungal Rash  WBC 19 (chronic), lactate 3.2 on admission, now lactate of 1.1, possibly elevated 2/2 metformin use  Given in ED vancomycin 1g x1, aztreonam 1g x1 (allergic to penicillin)  Discontinued vancomycin, aztreonam as per ID as no open/infected wound   ESR elevated at 40  F/u blood cultures  Monitor for fevers  q2 turning  No wound care consult at this time as no skin breakdown noted by primary team, ID or nursing staff  ID Dr. Matos / DR Perdomo consulted, -STOP Abx , local skin care

## 2022-01-20 NOTE — PROGRESS NOTE ADULT - PROBLEM SELECTOR PLAN 7
Chronic, well controlled  Continue home antihypertensive regimen: coreg, amlodipine, losartan  Routine hemodynamic monitoring Likely due to COVID, sacral decubitus ulcer  WBC 19, lactate 3.2 on admission  Antibiotics as above  Blood cultures, urine cultures pending, f/u results  Fall precautions  Monitor for fevers  ID Dr. Matos consulted Likely due to COVID, sacral decubitus ulcer  WBC 19, lactate 3.2 on admission  Antibiotics as above  Blood cultures, urine cultures pending, f/u results  Fall precautions  Monitor for fevers  PT---> HCPT

## 2022-01-20 NOTE — PHYSICAL THERAPY INITIAL EVALUATION ADULT - DISCHARGE DISPOSITION, PT EVAL
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

## 2022-01-20 NOTE — PROGRESS NOTE ADULT - PROBLEM SELECTOR PLAN 8
Chronic, well controlled  Albuterol PRN for SOB, wheezing Chronic, well controlled  Continue home antihypertensive regimen: coreg, amlodipine, losartan  Routine hemodynamic monitoring

## 2022-01-20 NOTE — DIETITIAN INITIAL EVALUATION ADULT. - PROBLEM SELECTOR PLAN 6
Likely due to  COVID ,  sacral decubitus ulcer  WBC 19, lactate 3.2 on admission  Antibiotics as above  Blood cultures, urine cultures pending, f/u results  Fall precautions  Monitor for fevers  ID Dr. Matos consulted

## 2022-01-20 NOTE — DISCHARGE NOTE PROVIDER - PROVIDER TOKENS
PROVIDER:[TOKEN:[[id number]],FOLLOWUP:[1 week]] PROVIDER:[TOKEN:[[id number]],FOLLOWUP:[1 week]],PROVIDER:[TOKEN:[[id number]]]

## 2022-01-20 NOTE — PROGRESS NOTE ADULT - PROBLEM SELECTOR PLAN 3
SPO2 ~95% on RA  Will defer steroids, antivirals for now as asymptomatic , RA O2 Sat- Nl   Supportive care  Monitor respiratory status  ID Dr. Matos consulted SPO2 ~95% on RA  Will defer steroids, antivirals for now as asymptomatic , RA O2 Sat- Nl   Supportive care  Monitor respiratory status  ID Dr. Perdomo /Dr Matos- d/w   asymptomatic , Plan for mAB Rx

## 2022-01-20 NOTE — PATIENT PROFILE ADULT - FALL HARM RISK - HARM RISK INTERVENTIONS

## 2022-01-20 NOTE — DISCHARGE NOTE PROVIDER - NSDCMRMEDTOKEN_GEN_ALL_CORE_FT
amLODIPine 2.5 mg oral tablet: 1 tab(s) orally once a day  Aspir 81 81 mg oral delayed release tablet: 1 tab(s) orally once a day  carvedilol 25 mg oral tablet: 1 tab(s) orally 2 times a day  clopidogrel 75 mg oral tablet: 1 tab(s) orally once a day  DULoxetine 30 mg oral delayed release capsule: 1 cap(s) orally once a day (at bedtime)  ezetimibe 10 mg oral tablet: 1 tab(s) orally once a day (at bedtime)  glipiZIDE 10 mg oral tablet: 1 tab(s) orally once a day  Jardiance 10 mg oral tablet: 1 tab(s) orally once a day (in the morning)  losartan 100 mg oral tablet: 1 tab(s) orally once a day  metformin 1000 mg oral tablet: 1 tab(s) orally 2 times a day  pantoprazole 40 mg oral delayed release tablet: 1 tab(s) orally once a day  Proventil HFA 90 mcg/inh inhalation aerosol: 2 puff(s) inhaled 4 times a day, As Needed  rosuvastatin 40 mg oral tablet: 1 tab(s) orally once a day  Vitamin D2 50,000 intl units (1.25 mg) oral capsule: 1 cap(s) orally once a week   amLODIPine 2.5 mg oral tablet: 1 tab(s) orally once a day  Aspir 81 81 mg oral delayed release tablet: 1 tab(s) orally once a day  carvedilol 25 mg oral tablet: 1 tab(s) orally 2 times a day  clopidogrel 75 mg oral tablet: 1 tab(s) orally once a day  clotrimazole 1% topical cream: Apply topically to affected area 2 times a day  where fungal rash is; Use for 2-4 weeks. If rash goes away after 2 weeks, discontinue use.  DULoxetine 30 mg oral delayed release capsule: 1 cap(s) orally once a day (at bedtime)  ezetimibe 10 mg oral tablet: 1 tab(s) orally once a day (at bedtime)  glipiZIDE 10 mg oral tablet: 1 tab(s) orally once a day  Jardiance 10 mg oral tablet: 1 tab(s) orally once a day (in the morning)  Lantus Solostar Pen 100 units/mL subcutaneous solution: 10 unit(s) subcutaneous once a day   losartan 100 mg oral tablet: 1 tab(s) orally once a day  metformin 1000 mg oral tablet: 1 tab(s) orally 2 times a day  pantoprazole 40 mg oral delayed release tablet: 1 tab(s) orally once a day  Proventil HFA 90 mcg/inh inhalation aerosol: 2 puff(s) inhaled 4 times a day, As Needed  rosuvastatin 40 mg oral tablet: 1 tab(s) orally once a day  Vitamin D2 50,000 intl units (1.25 mg) oral capsule: 1 cap(s) orally once a week

## 2022-01-20 NOTE — PHYSICAL THERAPY INITIAL EVALUATION ADULT - PERTINENT HX OF CURRENT PROBLEM, REHAB EVAL
67 yo F p/w BIB family for recently worsening sacral decub, some opening. No cp/sob. no known fever. Pt with some increased gen weakness. NO cough/ uri. no Covid exposures, pt fully vaccinated.

## 2022-01-20 NOTE — DISCHARGE NOTE PROVIDER - NSDCACTIVITY_GEN_ALL_CORE
No heavy lifting/straining Bathing allowed/Do not drive or operate machinery/Do not make important decisions/Walking - Indoors allowed/No heavy lifting/straining

## 2022-01-21 ENCOUNTER — TRANSCRIPTION ENCOUNTER (OUTPATIENT)
Age: 67
End: 2022-01-21

## 2022-01-21 VITALS
OXYGEN SATURATION: 92 % | RESPIRATION RATE: 18 BRPM | TEMPERATURE: 98 F | DIASTOLIC BLOOD PRESSURE: 74 MMHG | SYSTOLIC BLOOD PRESSURE: 117 MMHG | HEART RATE: 86 BPM

## 2022-01-21 LAB
ANION GAP SERPL CALC-SCNC: 8 MMOL/L — SIGNIFICANT CHANGE UP (ref 5–17)
BASOPHILS # BLD AUTO: 0.05 K/UL — SIGNIFICANT CHANGE UP (ref 0–0.2)
BASOPHILS NFR BLD AUTO: 0.3 % — SIGNIFICANT CHANGE UP (ref 0–2)
BUN SERPL-MCNC: 15 MG/DL — SIGNIFICANT CHANGE UP (ref 7–23)
CALCIUM SERPL-MCNC: 8.8 MG/DL — SIGNIFICANT CHANGE UP (ref 8.5–10.1)
CHLORIDE SERPL-SCNC: 102 MMOL/L — SIGNIFICANT CHANGE UP (ref 96–108)
CO2 SERPL-SCNC: 28 MMOL/L — SIGNIFICANT CHANGE UP (ref 22–31)
CREAT SERPL-MCNC: 0.61 MG/DL — SIGNIFICANT CHANGE UP (ref 0.5–1.3)
EOSINOPHIL # BLD AUTO: 0.15 K/UL — SIGNIFICANT CHANGE UP (ref 0–0.5)
EOSINOPHIL NFR BLD AUTO: 0.9 % — SIGNIFICANT CHANGE UP (ref 0–6)
GLUCOSE SERPL-MCNC: 326 MG/DL — HIGH (ref 70–99)
HCT VFR BLD CALC: 37.8 % — SIGNIFICANT CHANGE UP (ref 34.5–45)
HGB BLD-MCNC: 12.5 G/DL — SIGNIFICANT CHANGE UP (ref 11.5–15.5)
IMM GRANULOCYTES NFR BLD AUTO: 0.4 % — SIGNIFICANT CHANGE UP (ref 0–1.5)
LYMPHOCYTES # BLD AUTO: 57.9 % — HIGH (ref 13–44)
LYMPHOCYTES # BLD AUTO: 9.51 K/UL — HIGH (ref 1–3.3)
MCHC RBC-ENTMCNC: 26.9 PG — LOW (ref 27–34)
MCHC RBC-ENTMCNC: 33.1 GM/DL — SIGNIFICANT CHANGE UP (ref 32–36)
MCV RBC AUTO: 81.5 FL — SIGNIFICANT CHANGE UP (ref 80–100)
MONOCYTES # BLD AUTO: 0.72 K/UL — SIGNIFICANT CHANGE UP (ref 0–0.9)
MONOCYTES NFR BLD AUTO: 4.4 % — SIGNIFICANT CHANGE UP (ref 2–14)
NEUTROPHILS # BLD AUTO: 5.92 K/UL — SIGNIFICANT CHANGE UP (ref 1.8–7.4)
NEUTROPHILS NFR BLD AUTO: 36.1 % — LOW (ref 43–77)
NRBC # BLD: 0 /100 WBCS — SIGNIFICANT CHANGE UP (ref 0–0)
PLATELET # BLD AUTO: 461 K/UL — HIGH (ref 150–400)
POTASSIUM SERPL-MCNC: 3.7 MMOL/L — SIGNIFICANT CHANGE UP (ref 3.5–5.3)
POTASSIUM SERPL-SCNC: 3.7 MMOL/L — SIGNIFICANT CHANGE UP (ref 3.5–5.3)
RBC # BLD: 4.64 M/UL — SIGNIFICANT CHANGE UP (ref 3.8–5.2)
RBC # FLD: 14.3 % — SIGNIFICANT CHANGE UP (ref 10.3–14.5)
SODIUM SERPL-SCNC: 138 MMOL/L — SIGNIFICANT CHANGE UP (ref 135–145)
WBC # BLD: 16.42 K/UL — HIGH (ref 3.8–10.5)
WBC # FLD AUTO: 16.42 K/UL — HIGH (ref 3.8–10.5)

## 2022-01-21 PROCEDURE — 81001 URINALYSIS AUTO W/SCOPE: CPT

## 2022-01-21 PROCEDURE — 93005 ELECTROCARDIOGRAM TRACING: CPT

## 2022-01-21 PROCEDURE — 87040 BLOOD CULTURE FOR BACTERIA: CPT

## 2022-01-21 PROCEDURE — 80048 BASIC METABOLIC PNL TOTAL CA: CPT

## 2022-01-21 PROCEDURE — 36415 COLL VENOUS BLD VENIPUNCTURE: CPT

## 2022-01-21 PROCEDURE — 85652 RBC SED RATE AUTOMATED: CPT

## 2022-01-21 PROCEDURE — 96365 THER/PROPH/DIAG IV INF INIT: CPT

## 2022-01-21 PROCEDURE — 86803 HEPATITIS C AB TEST: CPT

## 2022-01-21 PROCEDURE — 82962 GLUCOSE BLOOD TEST: CPT

## 2022-01-21 PROCEDURE — 87637 SARSCOV2&INF A&B&RSV AMP PRB: CPT

## 2022-01-21 PROCEDURE — 86140 C-REACTIVE PROTEIN: CPT

## 2022-01-21 PROCEDURE — 87086 URINE CULTURE/COLONY COUNT: CPT

## 2022-01-21 PROCEDURE — 97112 NEUROMUSCULAR REEDUCATION: CPT

## 2022-01-21 PROCEDURE — 83036 HEMOGLOBIN GLYCOSYLATED A1C: CPT

## 2022-01-21 PROCEDURE — 96366 THER/PROPH/DIAG IV INF ADDON: CPT

## 2022-01-21 PROCEDURE — 97110 THERAPEUTIC EXERCISES: CPT

## 2022-01-21 PROCEDURE — 84484 ASSAY OF TROPONIN QUANT: CPT

## 2022-01-21 PROCEDURE — 96361 HYDRATE IV INFUSION ADD-ON: CPT

## 2022-01-21 PROCEDURE — 99233 SBSQ HOSP IP/OBS HIGH 50: CPT

## 2022-01-21 PROCEDURE — 97116 GAIT TRAINING THERAPY: CPT

## 2022-01-21 PROCEDURE — 85610 PROTHROMBIN TIME: CPT

## 2022-01-21 PROCEDURE — 85730 THROMBOPLASTIN TIME PARTIAL: CPT

## 2022-01-21 PROCEDURE — 97162 PT EVAL MOD COMPLEX 30 MIN: CPT

## 2022-01-21 PROCEDURE — 82550 ASSAY OF CK (CPK): CPT

## 2022-01-21 PROCEDURE — 71045 X-RAY EXAM CHEST 1 VIEW: CPT

## 2022-01-21 PROCEDURE — 99285 EMERGENCY DEPT VISIT HI MDM: CPT | Mod: 25

## 2022-01-21 PROCEDURE — 83605 ASSAY OF LACTIC ACID: CPT

## 2022-01-21 PROCEDURE — 80053 COMPREHEN METABOLIC PANEL: CPT

## 2022-01-21 PROCEDURE — 85025 COMPLETE CBC W/AUTO DIFF WBC: CPT

## 2022-01-21 RX ORDER — ENOXAPARIN SODIUM 100 MG/ML
10 INJECTION SUBCUTANEOUS
Qty: 100 | Refills: 0
Start: 2022-01-21 | End: 2022-02-19

## 2022-01-21 RX ADMIN — PANTOPRAZOLE SODIUM 40 MILLIGRAM(S): 20 TABLET, DELAYED RELEASE ORAL at 05:06

## 2022-01-21 RX ADMIN — NYSTATIN CREAM 1 APPLICATION(S): 100000 CREAM TOPICAL at 05:06

## 2022-01-21 RX ADMIN — CARVEDILOL PHOSPHATE 25 MILLIGRAM(S): 80 CAPSULE, EXTENDED RELEASE ORAL at 16:32

## 2022-01-21 RX ADMIN — AMLODIPINE BESYLATE 2.5 MILLIGRAM(S): 2.5 TABLET ORAL at 05:06

## 2022-01-21 RX ADMIN — LOSARTAN POTASSIUM 100 MILLIGRAM(S): 100 TABLET, FILM COATED ORAL at 05:06

## 2022-01-21 RX ADMIN — Medication 12: at 16:31

## 2022-01-21 RX ADMIN — DULOXETINE HYDROCHLORIDE 30 MILLIGRAM(S): 30 CAPSULE, DELAYED RELEASE ORAL at 11:11

## 2022-01-21 RX ADMIN — INSULIN GLARGINE 10 UNIT(S): 100 INJECTION, SOLUTION SUBCUTANEOUS at 16:50

## 2022-01-21 RX ADMIN — ENOXAPARIN SODIUM 40 MILLIGRAM(S): 100 INJECTION SUBCUTANEOUS at 11:11

## 2022-01-21 RX ADMIN — CARVEDILOL PHOSPHATE 25 MILLIGRAM(S): 80 CAPSULE, EXTENDED RELEASE ORAL at 05:06

## 2022-01-21 RX ADMIN — NYSTATIN CREAM 1 APPLICATION(S): 100000 CREAM TOPICAL at 15:20

## 2022-01-21 RX ADMIN — Medication 81 MILLIGRAM(S): at 11:11

## 2022-01-21 RX ADMIN — Medication 10: at 11:38

## 2022-01-21 RX ADMIN — CLOPIDOGREL BISULFATE 75 MILLIGRAM(S): 75 TABLET, FILM COATED ORAL at 11:11

## 2022-01-21 NOTE — PROGRESS NOTE ADULT - PROBLEM SELECTOR PLAN 6
Glucose in 300s on admission  Hold home metformin & jardiance while inpatient  Moderate dose insulin corrective scale, Lantus 10 U at bedtime added by endocrinology  A1C 12.5%  Consistent carb diet, 2 glucerna shakes/day  Endocrine consult- Dr. Perlman C Glucose in 300s on admission  Hold home metformin & jardiance while inpatient  Moderate dose insulin corrective scale, Lantus 10 U at bedtime added by endocrinology  A1C 12.5%  Consistent carb diet, 2 glucerna shakes/day  Endocrine consult- Dr. Perlman C-start Lantus 10 u SC q HS

## 2022-01-21 NOTE — PROGRESS NOTE ADULT - SUBJECTIVE AND OBJECTIVE BOX
Patient is a 66y old  Female who presents with a chief complaint of Infected sacral decubitus Ulcer, COVID (2022 16:29)    Patient is a 66y old  Female who presents with a chief complaint of Infected sacral decubitus Ulcer, COVID (2022 19:54)  HPI: 64 y/o F PMH CVA (2021, w/ residual L sided weakness, on ASA, plavix), leukemia (unknown type), T2DM, HTN, asthma, GERD presenting with worsening sacral decubitus ulcer and weakness for 3 days. Daughter endorses patient has been acting "funny" for 3 days, much weaker, lethargic, less responsive, which prompted family to bring her in to the ED. Daughter also endorses erythema of sacral area for 3 weeks, now progressing to involve satellite lesions of upper posterior and anterior thigh. Patient denies pruritis, warmth, pain. Denies fevers, chills, abdominal pain, n/v/d/c at home. At baseline, pt is incontinent of urine, mostly bedbound due to residual CVA weakness, requires assistance in all ADLs. Of note, pt found to be COVID (+) in ED, and she received her Pfizer 2nd vaccine yesterday . Denies sx including fever, chills, SOB, cough that is changed from her baseline cough, sore throat. Daughter denies sick contacts at home, but her family works outside the home.     ED course:   Vitals 97.3, HR 91, 133/70, RR 15, 95% RA  Labs WBC 19, lactate 3.2, trop wnl, COVID (+)  Given: vanco 1g x1, aztreonam 1g x1, 2L NS bolus    INTERVAL HPI:  22- Pt seen and examined at bedside this AM. She reports feeling well, and her only symptom is itchiness around the rash she has on the left groin/upper thigh area. She denies any pain where her pressure injury is on her lower back. She had a stroke on 2021 which has left her with residual L-sided weakness, but she has an attendant at home to help her get around. WBC to its base line since 2 yrs, ? CLL STOP ABx as per ID, mAB RX for COVID + as per ID  22- Pt seen and examined at bedside this AM. Pt has no complaints or concerns today. The rash on her groin is less itchy, and the pain in her sacral area is better. She also feels less weak and fatigued compared to when she first came to the hospital.     Overnight events: No acute overnight events.    Home Medications:  amLODIPine 2.5 mg oral tablet: 1 tab(s) orally once a day (2022 21:24)  Aspir 81 81 mg oral delayed release tablet: 1 tab(s) orally once a day (2022 21:24)  carvedilol 25 mg oral tablet: 1 tab(s) orally 2 times a day (2022 21:24)  clopidogrel 75 mg oral tablet: 1 tab(s) orally once a day (:24)  DULoxetine 30 mg oral delayed release capsule: 1 cap(s) orally once a day (at bedtime) (:24)  ezetimibe 10 mg oral tablet: 1 tab(s) orally once a day (at bedtime) (:24)  glipiZIDE 10 mg oral tablet: 1 tab(s) orally once a day (:24)  Jardiance 10 mg oral tablet: 1 tab(s) orally once a day (in the morning) (:24)  losartan 100 mg oral tablet: 1 tab(s) orally once a day (:24)  metformin 1000 mg oral tablet: 1 tab(s) orally 2 times a day (:24)  pantoprazole 40 mg oral delayed release tablet: 1 tab(s) orally once a day (2022 21:24)  Proventil HFA 90 mcg/inh inhalation aerosol: 2 puff(s) inhaled 4 times a day, As Needed (:24)  rosuvastatin 40 mg oral tablet: 1 tab(s) orally once a day (2022 21:24)  Vitamin D2 50,000 intl units (1.25 mg) oral capsule: 1 cap(s) orally once a week (2022 21:24)    MEDICATIONS  (STANDING):  amLODIPine   Tablet 2.5 milliGRAM(s) Oral daily  aspirin enteric coated 81 milliGRAM(s) Oral daily  atorvastatin 80 milliGRAM(s) Oral at bedtime  carvedilol 25 milliGRAM(s) Oral every 12 hours  clopidogrel Tablet 75 milliGRAM(s) Oral daily  dextrose 40% Gel 15 Gram(s) Oral once  dextrose 5%. 1000 milliLiter(s) (50 mL/Hr) IV Continuous <Continuous>  dextrose 5%. 1000 milliLiter(s) (100 mL/Hr) IV Continuous <Continuous>  dextrose 50% Injectable 25 Gram(s) IV Push once  dextrose 50% Injectable 12.5 Gram(s) IV Push once  dextrose 50% Injectable 25 Gram(s) IV Push once  DULoxetine 30 milliGRAM(s) Oral daily  enoxaparin Injectable 40 milliGRAM(s) SubCutaneous daily  ergocalciferol 50659 Unit(s) Oral <User Schedule>  glucagon  Injectable 1 milliGRAM(s) IntraMuscular once  insulin glargine Injectable (LANTUS) 10 Unit(s) SubCutaneous at bedtime  insulin lispro (ADMELOG) corrective regimen sliding scale   SubCutaneous three times a day before meals  insulin lispro (ADMELOG) corrective regimen sliding scale   SubCutaneous at bedtime  losartan 100 milliGRAM(s) Oral daily  nystatin Powder 1 Application(s) Topical three times a day  pantoprazole    Tablet 40 milliGRAM(s) Oral before breakfast    MEDICATIONS  (PRN):  acetaminophen     Tablet .. 650 milliGRAM(s) Oral every 6 hours PRN Temp greater or equal to 38C (100.4F), Mild Pain (1 - 3)  ALBUTerol    90 MICROgram(s) HFA Inhaler 2 Puff(s) Inhalation every 6 hours PRN Shortness of Breath and/or Wheezing  aluminum hydroxide/magnesium hydroxide/simethicone Suspension 30 milliLiter(s) Oral every 4 hours PRN Dyspepsia  melatonin 3 milliGRAM(s) Oral at bedtime PRN Insomnia  ondansetron Injectable 4 milliGRAM(s) IV Push every 8 hours PRN Nausea and/or Vomiting    Allergies:  penicillins (Unknown)    Social History:  Tobacco: denies  EtOH: denies  Recreational drug use: denies  Lives with: daughter's family  Ambulates: mostly bedbound, but can move her LEs  ADLs: requires assistance  Vaccinations: Pfizer x2, most recent  (2022 19:54)    REVIEW OF SYSTEMS:  CONSTITUTIONAL: No fever, No chills, No fatigue, No myalgia, No Body ache, No Weakness  EYES: No eye pain,  No visual disturbances, No discharge, No Redness  ENMT: No ear pain, No nose bleed, No vertigo; No sinus pain, No throat pain, No Congestion  NECK: No pain, No stiffness  RESPIRATORY: No cough, No wheezing, No hemoptysis, No shortness of breath  CARDIOVASCULAR: No chest pain, No palpitations  GASTROINTESTINAL: No abdominal pain, No epigastric pain. No nausea, No vomiting, No diarrhea, No constipation; [  ] BM  GENITOURINARY: No dysuria, No frequency, No urgency, No hematuria, No incontinence  NEUROLOGICAL: No headaches, No dizziness, No numbness, No tingling, + Chronic stable left-sided weakness.  EXTREMITIES: No Swelling, No Pain, No Edema  SKIN: [ x ] Rash around L groin area, itchiness improved  MUSCULOSKELETAL: No joint pain, No joint swelling; No muscle pain, No back pain, No extremity pain  PSYCHIATRIC: No depression, No anxiety, No mood swings, No difficulty sleeping at night  PAIN SCALE: [ x ] None  [  ] Other-  ROS Unable to obtain due to: [  ] Dementia  [  ] Lethargy  [  ] Sedated  [  ] Non verbal  REST OF REVIEW OF SYSTEMS: [ x ] Normal     Vital Signs Last 24 Hrs  T(C): 36.5 (2022 04:29), Max: 36.9 (2022 13:55)  T(F): 97.7 (2022 04:29), Max: 98.4 (2022 13:55)  HR: 78 (2022 04:29) (78 - 103)  BP: 163/80 (2022 04:29) (115/65 - 163/80)  RR: 17 (2022 04:29) (17 - 18)  SpO2: 95% (2022 04:29) (89% - 95%)    CAPILLARY BLOOD GLUCOSE  POCT Blood Glucose.: 193 mg/dL (2022 07:32)  POCT Blood Glucose.: 242 mg/dL (2022 21:04)  POCT Blood Glucose.: 284 mg/dL (2022 16:28)  POCT Blood Glucose.: 213 mg/dL (2022 11:24)    I&O's Summary    2022 07:01  -  2022 07:00  --------------------------------------------------------  IN: 240 mL / OUT: 3050 mL / NET: -2810 mL    PHYSICAL EXAM:  GENERAL:  [ x ] NAD, [ x ] Well appearing, [  ] Agitated, [  ] Drowsy, [  ] Lethargy, [  ] Confused   HEAD:  [ x ] Normal, [  ] Other  EYES:  [ x ] EOMI, [ x ] PERRLA, [ x ] Conjunctiva and sclera clear normal, [  ] Other, [  ] Pallor, [  ] Discharge  ENMT:  [ x ] Normal, [x  ] Moist mucous membranes, [ x ] Good dentition, [x ] No thrush  NECK:  [ x ] Supple, [ x ] No JVD, [ x ] Normal thyroid, [  ] Lymphadenopathy, [  ] Other  CHEST/LUNG:  [ x ] Clear to auscultation bilaterally, [ x ] Breath Sounds equal B/L / decreased, [  ] Poor effort, [ x ] No rales, [ x ] No rhonchi, [ x ] No wheezing  HEART:  [ x ] Regular rate and rhythm, [  ] Tachycardia, [  ] Bradycardia, [  ] Irregular, [ x ] No murmurs, No rubs, No gallops, [  ] PPM in place (Mfr:  )  ABDOMEN:  [ x ] Soft, [ x ] Nontender, [ x ] Nondistended, [  ] No mass, [ x ] Bowel sounds present, [  ] Obese  NERVOUS SYSTEM:  [ x ] Alert & Oriented x3, [ x ] LUE and LLE weakness but antigravity on both extremities,- Old chronic  [  ] Confusion, [  ] Encephalopathic, [  ] Sedated, [  ] Unable to assess, [  ] Dementia, [  ] Other-  EXTREMITIES:  [ x ] 2+ Peripheral Pulses, No clubbing, No cyanosis,  [  ] Edema B/L lower EXT, [  ] PVD stasis skin changes B/L lower EXT, [  ] Wound  LYMPH:  No lymphadenopathy noted  SKIN:  [ x ] Rash of anterior and posterior upper thigh/groin, [ x ] Sacral pressure wound, 1 x 0.3 cm, Stage II, [  ] Ecchymosis, [  ] Skin tears, [  ] Other    DIET: Diet, Consistent Carbohydrate/No Snacks:   DASH/TLC Sodium & Cholesterol Restricted  Supplement Feeding Modality:  Oral  Glucerna Shake Cans or Servings Per Day:  2       Frequency:  Two Times a day (22 @ 13:44)    LABS:    Ca    9.1        2022 05:23    PT/INR - ( 2022 18:51 )   PT: 13.6 sec;   INR: 1.17 ratio    PTT - ( 2022 18:51 )  PTT:30.3 sec    Urinalysis Basic - ( 2022 21:29 )  Color: Pale Yellow / Appearance: Slightly Turbid / S.005 / pH: x  Gluc: x / Ketone: Negative  / Bili: Negative / Urobili: Negative   Blood: x / Protein: 15 / Nitrite: Negative   Leuk Esterase: Small / RBC: 0-2 /HPF / WBC 0-2   Sq Epi: x / Non Sq Epi: Occasional / Bacteria: Occasional    Culture Results:   No growth to date. ( @ 00:55)  Culture Results:   No growth to date. ( @ 00:55)  Culture Results:   <10,000 CFU/mL Normal Urogenital Deloris ( @ 00:36)    culture blood  -- .Blood Blood-Peripheral  @ 00:55    culture urine  --   @ 00:55  culture blood  -- Clean Catch Clean Catch (Midstream)  @ 00:36    culture urine  --   @ 00:36    CARDIAC MARKERS ( 2022 18:51 )  x     / x     / 13 U/L / x     / x        Culture - Blood (collected 2022 00:55)  Source: .Blood Blood-Peripheral  Preliminary Report (2022 01:01):    No growth to date.    Culture - Blood (collected 2022 00:55)  Source: .Blood Blood-Peripheral  Preliminary Report (2022 01:01):    No growth to date.    Culture - Urine (collected 2022 00:36)  Source: Clean Catch Clean Catch (Midstream)  Final Report (2022 19:58):    <10,000 CFU/mL Normal Urogenital Deloris    RADIOLOGY & ADDITIONAL TESTS:    HEALTH ISSUES - PROBLEM Dx:  Sacral pressure ulcer  Leukocytosis  2019 novel coronavirus disease (COVID-19)  Diaper candidiasis  Cerebrovascular accident (CVA)  T2DM (type 2 diabetes mellitus)  Weakness  HTN (hypertension)  Asthma  GERD (gastroesophageal reflux disease)  Major depression  Need for prophylactic measure    Consultant(s) Notes Reviewed:  [ x ] YES     Care Discussed with [ x ] Consultants, [ x ] Patient, [  ] Family, [  ] HCP, [  ] , [  ] Social Service, [  ] RN, [  ] Physical Therapy, [  ] Palliative Care Team  DVT PPX: [ x ] Lovenox, [  ] SC Heparin, [  ] Coumadin, [  ] Xarelto, [  ] Eliquis, [  ] Pradaxa, [  ] IV Heparin drip, [  ] SCD, [  ] Ambulation, [  ] Contraindicated 2/2 GI Bleed, [  ] Contraindicated 2/2  Bleed, [  ] Contraindicated 2/2 Brain Bleed  Advanced Directive: [ x ] None, [  ] DNR/DNI Patient is a 66y old  Female who presents with a chief complaint of Infected sacral decubitus Ulcer, COVID (2022 16:29)    Patient is a 66y old  Female who presents with a chief complaint of Infected sacral decubitus Ulcer, COVID (2022 19:54)  HPI: 62 y/o F PMH CVA (2021, w/ residual L sided weakness, on ASA, plavix), CLL with Leukocytosis,, T2DM, HTN, asthma, GERD presenting with worsening sacral decubitus ulcer and weakness for 3 days. Daughter endorses patient has been acting "funny" for 3 days, much weaker, lethargic, less responsive, which prompted family to bring her in to the ED. Daughter also endorses erythema of sacral area for 3 weeks, now progressing to involve satellite lesions of upper posterior and anterior thigh. Patient denies pruritis, warmth, pain. Denies fevers, chills, abdominal pain, n/v/d/c at home. At baseline, pt is incontinent of urine, mostly bedbound due to residual CVA weakness, requires assistance in all ADLs. Of note, pt found to be COVID (+) in ED, and she received her Pfizer 2nd vaccine yesterday . Denies sx including fever, chills, SOB, cough that is changed from her baseline cough, sore throat. Daughter denies sick contacts at home, but her family works outside the home.     ED course:   Vitals 97.3, HR 91, 133/70, RR 15, 95% RA  Labs WBC 19, lactate 3.2, trop wnl, COVID (+)  Given: vanco 1g x1, aztreonam 1g x1, 2L NS bolus    INTERVAL HPI:  22- Pt seen and examined at bedside this AM. She reports feeling well, and her only symptom is itchiness around the rash she has on the left groin/upper thigh area. She denies any pain where her pressure injury is on her lower back. She had a stroke on 2021 which has left her with residual L-sided weakness, but she has an attendant at home to help her get around. WBC to its base line since 2 yrs, ? CLL STOP ABx as per ID, mAB RX for COVID + as per ID  22- Pt seen and examined at bedside this AM. Pt has no complaints or concerns today. The rash on her groin is less itchy, and the pain in her sacral area is better. She also feels less weak and fatigued compared to when she first came to the hospital.     Overnight events: No acute overnight events.    Home Medications:  amLODIPine 2.5 mg oral tablet: 1 tab(s) orally once a day (2022 21:24)  Aspir 81 81 mg oral delayed release tablet: 1 tab(s) orally once a day (2022 21:24)  carvedilol 25 mg oral tablet: 1 tab(s) orally 2 times a day (2022 21:24)  clopidogrel 75 mg oral tablet: 1 tab(s) orally once a day (:24)  DULoxetine 30 mg oral delayed release capsule: 1 cap(s) orally once a day (at bedtime) (:24)  ezetimibe 10 mg oral tablet: 1 tab(s) orally once a day (at bedtime) (:24)  glipiZIDE 10 mg oral tablet: 1 tab(s) orally once a day (:24)  Jardiance 10 mg oral tablet: 1 tab(s) orally once a day (in the morning) (:24)  losartan 100 mg oral tablet: 1 tab(s) orally once a day (:24)  metformin 1000 mg oral tablet: 1 tab(s) orally 2 times a day (:24)  pantoprazole 40 mg oral delayed release tablet: 1 tab(s) orally once a day (2022 21:24)  Proventil HFA 90 mcg/inh inhalation aerosol: 2 puff(s) inhaled 4 times a day, As Needed (:24)  rosuvastatin 40 mg oral tablet: 1 tab(s) orally once a day (2022 21:24)  Vitamin D2 50,000 intl units (1.25 mg) oral capsule: 1 cap(s) orally once a week (2022 21:24)    MEDICATIONS  (STANDING):  amLODIPine   Tablet 2.5 milliGRAM(s) Oral daily  aspirin enteric coated 81 milliGRAM(s) Oral daily  atorvastatin 80 milliGRAM(s) Oral at bedtime  carvedilol 25 milliGRAM(s) Oral every 12 hours  clopidogrel Tablet 75 milliGRAM(s) Oral daily  dextrose 40% Gel 15 Gram(s) Oral once  dextrose 5%. 1000 milliLiter(s) (50 mL/Hr) IV Continuous <Continuous>  dextrose 5%. 1000 milliLiter(s) (100 mL/Hr) IV Continuous <Continuous>  dextrose 50% Injectable 25 Gram(s) IV Push once  dextrose 50% Injectable 12.5 Gram(s) IV Push once  dextrose 50% Injectable 25 Gram(s) IV Push once  DULoxetine 30 milliGRAM(s) Oral daily  enoxaparin Injectable 40 milliGRAM(s) SubCutaneous daily  ergocalciferol 59729 Unit(s) Oral <User Schedule>  glucagon  Injectable 1 milliGRAM(s) IntraMuscular once  insulin glargine Injectable (LANTUS) 10 Unit(s) SubCutaneous at bedtime  insulin lispro (ADMELOG) corrective regimen sliding scale   SubCutaneous three times a day before meals  insulin lispro (ADMELOG) corrective regimen sliding scale   SubCutaneous at bedtime  losartan 100 milliGRAM(s) Oral daily  nystatin Powder 1 Application(s) Topical three times a day  pantoprazole    Tablet 40 milliGRAM(s) Oral before breakfast    MEDICATIONS  (PRN):  acetaminophen     Tablet .. 650 milliGRAM(s) Oral every 6 hours PRN Temp greater or equal to 38C (100.4F), Mild Pain (1 - 3)  ALBUTerol    90 MICROgram(s) HFA Inhaler 2 Puff(s) Inhalation every 6 hours PRN Shortness of Breath and/or Wheezing  aluminum hydroxide/magnesium hydroxide/simethicone Suspension 30 milliLiter(s) Oral every 4 hours PRN Dyspepsia  melatonin 3 milliGRAM(s) Oral at bedtime PRN Insomnia  ondansetron Injectable 4 milliGRAM(s) IV Push every 8 hours PRN Nausea and/or Vomiting    Allergies:  penicillins (Unknown)    Social History:  Tobacco: denies  EtOH: denies  Recreational drug use: denies  Lives with: daughter's family  Ambulates: mostly bedbound, but can move her LEs  ADLs: requires assistance  Vaccinations: Pfizer x2, most recent  (2022 19:54)    REVIEW OF SYSTEMS: i want to go home  CONSTITUTIONAL: No fever, No chills, No fatigue, No myalgia, No Body ache, No Weakness  EYES: No eye pain,  No visual disturbances, No discharge, No Redness  ENMT: No ear pain, No nose bleed, No vertigo; No sinus pain, No throat pain, No Congestion  NECK: No pain, No stiffness  RESPIRATORY: No cough, No wheezing, No hemoptysis, No shortness of breath  CARDIOVASCULAR: No chest pain, No palpitations  GASTROINTESTINAL: No abdominal pain, No epigastric pain. No nausea, No vomiting, No diarrhea, No constipation; [  ] BM  GENITOURINARY: No dysuria, No frequency, No urgency, No hematuria, No incontinence  NEUROLOGICAL: No headaches, No dizziness, No numbness, No tingling, + Chronic stable left-sided weakness.  EXTREMITIES: No Swelling, No Pain, No Edema  SKIN: [ x ] Rash around L groin area, itchiness improved  MUSCULOSKELETAL: No joint pain, No joint swelling; No muscle pain, No back pain, No extremity pain  PSYCHIATRIC: No depression, No anxiety, No mood swings, No difficulty sleeping at night  PAIN SCALE: [ x ] None  [  ] Other-  ROS Unable to obtain due to: [  ] Dementia  [  ] Lethargy  [  ] Sedated  [  ] Non verbal  REST OF REVIEW OF SYSTEMS: [ x ] Normal     Vital Signs Last 24 Hrs  T(C): 36.5 (2022 04:29), Max: 36.9 (2022 13:55)  T(F): 97.7 (2022 04:29), Max: 98.4 (2022 13:55)  HR: 78 (2022 04:29) (78 - 103)  BP: 163/80 (2022 04:29) (115/65 - 163/80)  RR: 17 (2022 04:29) (17 - 18)  SpO2: 95% (2022 04:29) (89% - 95%)    CAPILLARY BLOOD GLUCOSE  POCT Blood Glucose.: 193 mg/dL (2022 07:32)  POCT Blood Glucose.: 242 mg/dL (2022 21:04)  POCT Blood Glucose.: 284 mg/dL (2022 16:28)  POCT Blood Glucose.: 213 mg/dL (2022 11:24)    I&O's Summary    2022 07:01  -  2022 07:00  --------------------------------------------------------  IN: 240 mL / OUT: 3050 mL / NET: -2810 mL    PHYSICAL EXAM:  GENERAL:  [ x ] NAD, [ x ] Well appearing, [  ] Agitated, [  ] Drowsy, [  ] Lethargy, [  ] Confused   HEAD:  [ x ] Normal, [  ] Other  EYES:  [ x ] EOMI, [ x ] PERRLA, [ x ] Conjunctiva and sclera clear normal, [  ] Other, [  ] Pallor, [  ] Discharge  ENMT:  [ x ] Normal, [x  ] Moist mucous membranes, [ x ] Good dentition, [x ] No thrush  NECK:  [ x ] Supple, [ x ] No JVD, [ x ] Normal thyroid, [  ] Lymphadenopathy, [  ] Other  CHEST/LUNG:  [ x ] Clear to auscultation bilaterally, [ x ] Breath Sounds equal B/L / decreased, [  ] Poor effort, [ x ] No rales, [ x ] No rhonchi, [ x ] No wheezing  HEART:  [ x ] Regular rate and rhythm, [  ] Tachycardia, [  ] Bradycardia, [  ] Irregular, [ x ] No murmurs, No rubs, No gallops, [  ] PPM in place (Mfr:  )  ABDOMEN:  [ x ] Soft, [ x ] Nontender, [ x ] Nondistended, [  ] No mass, [ x ] Bowel sounds present, [  ] Obese  NERVOUS SYSTEM:  [ x ] Alert & Oriented x3, [ x ] LUE and LLE weakness but antigravity on both extremities,- Old chronic  [  ] Confusion, [  ] Encephalopathic, [  ] Sedated, [  ] Unable to assess, [  ] Dementia, [  ] Other-  EXTREMITIES:  [ x ] 2+ Peripheral Pulses, No clubbing, No cyanosis,  [  ] Edema B/L lower EXT, [  ] PVD stasis skin changes B/L lower EXT, [  ] Wound  LYMPH:  No lymphadenopathy noted  SKIN:  [ x ] Rash of anterior and posterior upper thigh/groin, [ x ] Sacral pressure wound, 1 x 0.3 cm, Stage II, [  ] Ecchymosis, [  ] Skin tears, [  ] Other    DIET: Diet, Consistent Carbohydrate/No Snacks:   DASH/TLC Sodium & Cholesterol Restricted  Supplement Feeding Modality:  Oral  Glucerna Shake Cans or Servings Per Day:  2       Frequency:  Two Times a day (22 @ 13:44)    LABS:    Ca    9.1        2022 05:23    PT/INR - ( 2022 18:51 )   PT: 13.6 sec;   INR: 1.17 ratio    PTT - ( 2022 18:51 )  PTT:30.3 sec    Urinalysis Basic - ( 2022 21:29 )  Color: Pale Yellow / Appearance: Slightly Turbid / S.005 / pH: x  Gluc: x / Ketone: Negative  / Bili: Negative / Urobili: Negative   Blood: x / Protein: 15 / Nitrite: Negative   Leuk Esterase: Small / RBC: 0-2 /HPF / WBC 0-2   Sq Epi: x / Non Sq Epi: Occasional / Bacteria: Occasional    Culture Results:   No growth to date. ( @ 00:55)  Culture Results:   No growth to date. ( @ 00:55)  Culture Results:   <10,000 CFU/mL Normal Urogenital Deloris ( @ 00:36)    culture blood  -- .Blood Blood-Peripheral  @ 00:55    culture urine  --   @ 00:55  culture blood  -- Clean Catch Clean Catch (Midstream)  @ 00:36    culture urine  --   @ 00:36    CARDIAC MARKERS ( 2022 18:51 )  x     / x     / 13 U/L / x     / x        Culture - Blood (collected 2022 00:55)  Source: .Blood Blood-Peripheral  Preliminary Report (2022 01:01):    No growth to date.    Culture - Blood (collected 2022 00:55)  Source: .Blood Blood-Peripheral  Preliminary Report (2022 01:01):    No growth to date.    Culture - Urine (collected 2022 00:36)  Source: Clean Catch Clean Catch (Midstream)  Final Report (2022 19:58):    <10,000 CFU/mL Normal Urogenital Deloris    RADIOLOGY & ADDITIONAL TESTS:    HEALTH ISSUES - PROBLEM Dx:  Sacral pressure ulcer  Leukocytosis  2019 novel coronavirus disease (COVID-19)  Diaper candidiasis  Cerebrovascular accident (CVA)  T2DM (type 2 diabetes mellitus)  Weakness  HTN (hypertension)  Asthma  GERD (gastroesophageal reflux disease)  Major depression  Need for prophylactic measure    Consultant(s) Notes Reviewed:  [ x ] YES     Care Discussed with [ x ] Consultants, [ x ] Patient, [  ] Family, [  ] HCP, [  ] , [  ] Social Service, [  ] RN, [  ] Physical Therapy, [  ] Palliative Care Team  DVT PPX: [ x ] Lovenox, [  ] SC Heparin, [  ] Coumadin, [  ] Xarelto, [  ] Eliquis, [  ] Pradaxa, [  ] IV Heparin drip, [  ] SCD, [  ] Ambulation, [  ] Contraindicated 2/2 GI Bleed, [  ] Contraindicated 2/2  Bleed, [  ] Contraindicated 2/2 Brain Bleed  Advanced Directive: [ x ] None, [  ] DNR/DNI Patient is a 66y old  Female who presents with a chief complaint of Infected sacral decubitus Ulcer, COVID (2022 16:29)    Patient is a 66y old  Female who presents with a chief complaint of Infected sacral decubitus Ulcer, COVID (2022 19:54)  HPI: 62 y/o F PMH CVA (2021, w/ residual L sided weakness, on ASA, plavix), CLL with Leukocytosis,, T2DM, HTN, asthma, GERD presenting with worsening sacral decubitus ulcer and weakness for 3 days. Daughter endorses patient has been acting "funny" for 3 days, much weaker, lethargic, less responsive, which prompted family to bring her in to the ED. Daughter also endorses erythema of sacral area for 3 weeks, now progressing to involve satellite lesions of upper posterior and anterior thigh. Patient denies pruritis, warmth, pain. Denies fevers, chills, abdominal pain, n/v/d/c at home. At baseline, pt is incontinent of urine, mostly bedbound due to residual CVA weakness, requires assistance in all ADLs. Of note, pt found to be COVID (+) in ED, and she received her Pfizer 2nd vaccine yesterday . Denies sx including fever, chills, SOB, cough that is changed from her baseline cough, sore throat. Daughter denies sick contacts at home, but her family works outside the home.     ED course:   Vitals 97.3, HR 91, 133/70, RR 15, 95% RA  Labs WBC 19, lactate 3.2, trop wnl, COVID (+)  Given: vanco 1g x1, aztreonam 1g x1, 2L NS bolus    INTERVAL HPI:  22- Pt seen and examined at bedside this AM. She reports feeling well, and her only symptom is itchiness around the rash she has on the left groin/upper thigh area. She denies any pain where her pressure injury is on her lower back. She had a stroke on 2021 which has left her with residual L-sided weakness, but she has an attendant at home to help her get around. WBC to its base line since 2 yrs, ? CLL STOP ABx as per ID, mAB RX for COVID + as per ID  22- Pt seen and examined at bedside this AM. Pt has no complaints or concerns today. The rash on her groin is less itchy, and the pain in her sacral area is better. She also feels less weak and fatigued compared to when she first came to the hospital. Started on Lantus 10 u SC prior to d/c.     Overnight events: No acute overnight events.    Home Medications:  amLODIPine 2.5 mg oral tablet: 1 tab(s) orally once a day (2022 21:24)  Aspir 81 81 mg oral delayed release tablet: 1 tab(s) orally once a day (2022 21:24)  carvedilol 25 mg oral tablet: 1 tab(s) orally 2 times a day (2022 21:24)  clopidogrel 75 mg oral tablet: 1 tab(s) orally once a day (:24)  DULoxetine 30 mg oral delayed release capsule: 1 cap(s) orally once a day (at bedtime) (2022 21:24)  ezetimibe 10 mg oral tablet: 1 tab(s) orally once a day (at bedtime) (2022 21:24)  glipiZIDE 10 mg oral tablet: 1 tab(s) orally once a day (2022 21:24)  Jardiance 10 mg oral tablet: 1 tab(s) orally once a day (in the morning) (2022 21:24)  losartan 100 mg oral tablet: 1 tab(s) orally once a day (2022 21:24)  metformin 1000 mg oral tablet: 1 tab(s) orally 2 times a day (2022 21:24)  pantoprazole 40 mg oral delayed release tablet: 1 tab(s) orally once a day (2022 21:24)  Proventil HFA 90 mcg/inh inhalation aerosol: 2 puff(s) inhaled 4 times a day, As Needed (2022 21:24)  rosuvastatin 40 mg oral tablet: 1 tab(s) orally once a day (2022 21:24)  Vitamin D2 50,000 intl units (1.25 mg) oral capsule: 1 cap(s) orally once a week (2022 21:24)    MEDICATIONS  (STANDING):  amLODIPine   Tablet 2.5 milliGRAM(s) Oral daily  aspirin enteric coated 81 milliGRAM(s) Oral daily  atorvastatin 80 milliGRAM(s) Oral at bedtime  carvedilol 25 milliGRAM(s) Oral every 12 hours  clopidogrel Tablet 75 milliGRAM(s) Oral daily  dextrose 40% Gel 15 Gram(s) Oral once  dextrose 5%. 1000 milliLiter(s) (50 mL/Hr) IV Continuous <Continuous>  dextrose 5%. 1000 milliLiter(s) (100 mL/Hr) IV Continuous <Continuous>  dextrose 50% Injectable 25 Gram(s) IV Push once  dextrose 50% Injectable 12.5 Gram(s) IV Push once  dextrose 50% Injectable 25 Gram(s) IV Push once  DULoxetine 30 milliGRAM(s) Oral daily  enoxaparin Injectable 40 milliGRAM(s) SubCutaneous daily  ergocalciferol 90023 Unit(s) Oral <User Schedule>  glucagon  Injectable 1 milliGRAM(s) IntraMuscular once  insulin glargine Injectable (LANTUS) 10 Unit(s) SubCutaneous at bedtime  insulin lispro (ADMELOG) corrective regimen sliding scale   SubCutaneous three times a day before meals  insulin lispro (ADMELOG) corrective regimen sliding scale   SubCutaneous at bedtime  losartan 100 milliGRAM(s) Oral daily  nystatin Powder 1 Application(s) Topical three times a day  pantoprazole    Tablet 40 milliGRAM(s) Oral before breakfast    MEDICATIONS  (PRN):  acetaminophen     Tablet .. 650 milliGRAM(s) Oral every 6 hours PRN Temp greater or equal to 38C (100.4F), Mild Pain (1 - 3)  ALBUTerol    90 MICROgram(s) HFA Inhaler 2 Puff(s) Inhalation every 6 hours PRN Shortness of Breath and/or Wheezing  aluminum hydroxide/magnesium hydroxide/simethicone Suspension 30 milliLiter(s) Oral every 4 hours PRN Dyspepsia  melatonin 3 milliGRAM(s) Oral at bedtime PRN Insomnia  ondansetron Injectable 4 milliGRAM(s) IV Push every 8 hours PRN Nausea and/or Vomiting    Allergies:  penicillins (Unknown)    Social History:  Tobacco: denies  EtOH: denies  Recreational drug use: denies  Lives with: daughter's family  Ambulates: mostly bedbound, but can move her LEs  ADLs: requires assistance  Vaccinations: Pfizer x2, most recent  (2022 19:54)    REVIEW OF SYSTEMS: i want to go home  CONSTITUTIONAL: No fever, No chills, No fatigue, No myalgia, No Body ache, No Weakness  EYES: No eye pain,  No visual disturbances, No discharge, No Redness  ENMT: No ear pain, No nose bleed, No vertigo; No sinus pain, No throat pain, No Congestion  NECK: No pain, No stiffness  RESPIRATORY: No cough, No wheezing, No hemoptysis, No shortness of breath  CARDIOVASCULAR: No chest pain, No palpitations  GASTROINTESTINAL: No abdominal pain, No epigastric pain. No nausea, No vomiting, No diarrhea, No constipation; [  ] BM  GENITOURINARY: No dysuria, No frequency, No urgency, No hematuria, No incontinence  NEUROLOGICAL: No headaches, No dizziness, No numbness, No tingling, + Chronic stable left-sided weakness.  EXTREMITIES: No Swelling, No Pain, No Edema  SKIN: [ x ] Rash around L groin area, itchiness improved  MUSCULOSKELETAL: No joint pain, No joint swelling; No muscle pain, No back pain, No extremity pain  PSYCHIATRIC: No depression, No anxiety, No mood swings, No difficulty sleeping at night  PAIN SCALE: [ x ] None  [  ] Other-  ROS Unable to obtain due to: [  ] Dementia  [  ] Lethargy  [  ] Sedated  [  ] Non verbal  REST OF REVIEW OF SYSTEMS: [ x ] Normal     Vital Signs Last 24 Hrs  T(C): 36.5 (2022 04:29), Max: 36.9 (2022 13:55)  T(F): 97.7 (2022 04:29), Max: 98.4 (2022 13:55)  HR: 78 (2022 04:29) (78 - 103)  BP: 163/80 (2022 04:29) (115/65 - 163/80)  RR: 17 (2022 04:29) (17 - 18)  SpO2: 95% (2022 04:29) (89% - 95%)    CAPILLARY BLOOD GLUCOSE  POCT Blood Glucose.: 193 mg/dL (2022 07:32)  POCT Blood Glucose.: 242 mg/dL (2022 21:04)  POCT Blood Glucose.: 284 mg/dL (2022 16:28)  POCT Blood Glucose.: 213 mg/dL (2022 11:24)    I&O's Summary    2022 07:01  -  2022 07:00  --------------------------------------------------------  IN: 240 mL / OUT: 3050 mL / NET: -2810 mL    PHYSICAL EXAM:  GENERAL:  [ x ] NAD, [ x ] Well appearing, [  ] Agitated, [  ] Drowsy, [  ] Lethargy, [  ] Confused   HEAD:  [ x ] Normal, [  ] Other  EYES:  [ x ] EOMI, [ x ] PERRLA, [ x ] Conjunctiva and sclera clear normal, [  ] Other, [  ] Pallor, [  ] Discharge  ENMT:  [ x ] Normal, [x  ] Moist mucous membranes, [ x ] Good dentition, [x ] No thrush  NECK:  [ x ] Supple, [ x ] No JVD, [ x ] Normal thyroid, [  ] Lymphadenopathy, [  ] Other  CHEST/LUNG:  [ x ] Clear to auscultation bilaterally, [ x ] Breath Sounds equal B/L / decreased, [  ] Poor effort, [ x ] No rales, [ x ] No rhonchi, [ x ] No wheezing  HEART:  [ x ] Regular rate and rhythm, [  ] Tachycardia, [  ] Bradycardia, [  ] Irregular, [ x ] No murmurs, No rubs, No gallops, [  ] PPM in place (Mfr:  )  ABDOMEN:  [ x ] Soft, [ x ] Nontender, [ x ] Nondistended, [x  ] No mass, [ x ] Bowel sounds present, [  ] Obese  NERVOUS SYSTEM:  [ x ] Alert & Oriented x3, [ x ] LUE and LLE weakness but antigravity on both extremities,- Old chronic  [  ] Confusion, [  ] Encephalopathic, [  ] Sedated, [  ] Unable to assess, [  ] Dementia, [  ] Other-  EXTREMITIES:  [ x ] 2+ Peripheral Pulses, No clubbing, No cyanosis,  [  ] Edema B/L lower EXT, [  ] PVD stasis skin changes B/L lower EXT, [  ] Wound + left sided weakness HP -Chronic  LYMPH:  No lymphadenopathy noted  SKIN:  [ x ] Rash of anterior and posterior upper thigh/groin, [ x ] Sacral pressure wound, 1 x 0.3 cm, Stage II, [  ] Ecchymosis, [  ] Skin tears, [  ] Other    DIET: Diet, Consistent Carbohydrate/No Snacks:   DASH/TLC Sodium & Cholesterol Restricted  Supplement Feeding Modality:  Oral  Glucerna Shake Cans or Servings Per Day:  2       Frequency:  Two Times a day (22 @ 13:44)    LABS:    Ca    9.1        2022 05:23    PT/INR - ( 2022 18:51 )   PT: 13.6 sec;   INR: 1.17 ratio    PTT - ( 2022 18:51 )  PTT:30.3 sec    Urinalysis Basic - ( 2022 21:29 )  Color: Pale Yellow / Appearance: Slightly Turbid / S.005 / pH: x  Gluc: x / Ketone: Negative  / Bili: Negative / Urobili: Negative   Blood: x / Protein: 15 / Nitrite: Negative   Leuk Esterase: Small / RBC: 0-2 /HPF / WBC 0-2   Sq Epi: x / Non Sq Epi: Occasional / Bacteria: Occasional    Culture Results:   No growth to date. ( @ 00:55)  Culture Results:   No growth to date. ( @ 00:55)  Culture Results:   <10,000 CFU/mL Normal Urogenital Deloris ( @ 00:36)    culture blood  -- .Blood Blood-Peripheral  @ 00:55    culture urine  --   @ 00:55  culture blood  -- Clean Catch Clean Catch (Midstream)  @ 00:36    culture urine  --   @ 00:36    CARDIAC MARKERS ( 2022 18:51 )  x     / x     / 13 U/L / x     / x        Culture - Blood (collected 2022 00:55)  Source: .Blood Blood-Peripheral  Preliminary Report (2022 01:01):    No growth to date.    Culture - Blood (collected 2022 00:55)  Source: .Blood Blood-Peripheral  Preliminary Report (2022 01:01):    No growth to date.    Culture - Urine (collected 2022 00:36)  Source: Clean Catch Clean Catch (Midstream)  Final Report (2022 19:58):    <10,000 CFU/mL Normal Urogenital Deloris    RADIOLOGY & ADDITIONAL TESTS:    HEALTH ISSUES - PROBLEM Dx:  Sacral pressure ulcer  Leukocytosis  2019 novel coronavirus disease (COVID-19)  Diaper candidiasis  Cerebrovascular accident (CVA)  T2DM (type 2 diabetes mellitus)  Weakness  HTN (hypertension)  Asthma  GERD (gastroesophageal reflux disease)  Major depression  Need for prophylactic measure    Consultant(s) Notes Reviewed:  [ x ] YES     Care Discussed with [ x ] Consultants, [ x ] Patient, [ x ] Family,- dtr  [  ] HCP, [  ] , [  ] Social Service, [ x ] RN, [  ] Physical Therapy, [  ] Palliative Care Team  DVT PPX: [ x ] Lovenox, [  ] SC Heparin, [  ] Coumadin, [  ] Xarelto, [  ] Eliquis, [  ] Pradaxa, [  ] IV Heparin drip, [  ] SCD, [  ] Ambulation, [  ] Contraindicated 2/2 GI Bleed, [  ] Contraindicated 2/2  Bleed, [  ] Contraindicated 2/2 Brain Bleed  Advanced Directive: [ x ] None, [  ] DNR/DNI

## 2022-01-21 NOTE — PROGRESS NOTE ADULT - PROBLEM SELECTOR PLAN 12
DVT ppx: Lovenox 40mg daily    Dispo: Likely discharge today, home with home PT
DVT ppx: Lovenox 40mg daily

## 2022-01-21 NOTE — PROGRESS NOTE ADULT - ATTENDING COMMENTS
64 y/o F PMH CVA (1/2021, w/ residual L sided weakness, on ASA, plavix), leukemia (unknown type), T2DM, HTN, asthma, GERD presenting with weakness for 3 days, leukocytosis , sacral pressure ulcer & fungal rash, + COVID   Pt seen, examined, case & care plan d/w pt, residents at detail.  AM Labs   PT Eval---> HCPT  PO diet  ID -DR Perdomo d/w at detail, plan for mAB RX   Skin Care as per RN  D/W Dtr at very Detail.  D/W Case management & Dietary  ENDO-Dr C perlman D/W -continue all home meds with Lantus 10 u SC q HS.  D/C Home, Total d/c care time is 60 minutes.  - 64 y/o F PMH CVA (1/2021, w/ residual L sided weakness, on ASA, plavix), leukemia (unknown type), T2DM, HTN, asthma, GERD presenting with weakness for 3 days, leukocytosis , sacral pressure ulcer & fungal rash, + COVID   Pt seen, examined, case & care plan d/w pt, residents at detail.  AM Labs   PT Eval---> HCPT  PO diet  ID -DR Perdomo d/w at detail, plan for mAB RX   Skin Care as per RN  D/W Dtr at very Detail.  D/W Case management & Dietary   ENDO-Dr C perlman D/W -continue all home meds with Lantus 10 u SC q HS.  D/C Home, Total d/c care time is 60 minutes.  -

## 2022-01-21 NOTE — PROGRESS NOTE ADULT - PROBLEM SELECTOR PLAN 7
Likely due to COVID, sacral decubitus ulcer  WBC 19, lactate 3.2 on admission  Antibiotics as above  Blood cultures, urine cultures pending, f/u results  Fall precautions  Monitor for fevers  PT---> HCPT Likely due to COVID, sacral decubitus ulcer-2  WBC 19, lactate 3.2 on admission  Antibiotics as above  Blood cultures, urine cultures pending, f/u results  Fall precautions  Monitor for fevers  PT---> HCPT

## 2022-01-21 NOTE — PROGRESS NOTE ADULT - PROBLEM SELECTOR PLAN 1
Patient is mostly bedbound after 2021 CVA, also incontinent of urine with diaper Rash/Fungal Rash  WBC 19 (chronic), lactate 3.2 on admission, now lactate of 1.1, possibly elevated 2/2 metformin use  Given in ED vancomycin 1g x1, aztreonam 1g x1 (allergic to penicillin)  Discontinued vancomycin, aztreonam as per ID as no open/infected wound   ESR elevated at 40  F/u blood cultures  Monitor for fevers  q2 turning  No wound care consult at this time as no skin breakdown noted by primary team, ID or nursing staff  ID Dr. Matos / DR Perdomo consulted, -STOP Abx , local skin care Patient is mostly bedbound after 2021 CVA, also incontinent of urine with diaper Rash/Fungal Rash  WBC 19 (chronic), lactate 3.2 on admission, now lactate of 1.1, possibly elevated 2/2 metformin use  Given in ED vancomycin 1g x1, aztreonam 1g x1 (allergic to penicillin)  Discontinued vancomycin, aztreonam as per ID as no open/infected wound   ESR elevated at 40   blood cultures- Neg   Monitor for fevers  q2 turning  No wound care consult at this time as no skin breakdown noted by primary team, ID or nursing staff  ID Dr. Matos / DR Perdomo consulted, -STOP Abx , local skin care, stable for d/c home

## 2022-01-21 NOTE — PROGRESS NOTE ADULT - SUBJECTIVE AND OBJECTIVE BOX
[facility]  INFECTIOUS DISEASES - Aakash Matos, [place] [address]  Tel: [phone]     Fax: [phone]  =======================================================    SHELBIE ALESSANDRA [id number]    Follow up: No fevers. On room air. Denies any cough or SOB.    Allergies:  penicillins (Unknown)      Antibiotics:  acetaminophen     Tablet .. 650 milliGRAM(s) Oral every 6 hours PRN  ALBUTerol    90 MICROgram(s) HFA Inhaler 2 Puff(s) Inhalation every 6 hours PRN  aluminum hydroxide/magnesium hydroxide/simethicone Suspension 30 milliLiter(s) Oral every 4 hours PRN  amLODIPine   Tablet 2.5 milliGRAM(s) Oral daily  aspirin enteric coated 81 milliGRAM(s) Oral daily  atorvastatin 80 milliGRAM(s) Oral at bedtime  carvedilol 25 milliGRAM(s) Oral every 12 hours  clopidogrel Tablet 75 milliGRAM(s) Oral daily  dextrose 40% Gel 15 Gram(s) Oral once  dextrose 5%. 1000 milliLiter(s) IV Continuous <Continuous>  dextrose 5%. 1000 milliLiter(s) IV Continuous <Continuous>  dextrose 50% Injectable 25 Gram(s) IV Push once  dextrose 50% Injectable 12.5 Gram(s) IV Push once  dextrose 50% Injectable 25 Gram(s) IV Push once  DULoxetine 30 milliGRAM(s) Oral daily  enoxaparin Injectable 40 milliGRAM(s) SubCutaneous daily  ergocalciferol 06552 Unit(s) Oral <User Schedule>  glucagon  Injectable 1 milliGRAM(s) IntraMuscular once  insulin glargine Injectable (LANTUS) 10 Unit(s) SubCutaneous at bedtime  insulin lispro (ADMELOG) corrective regimen sliding scale   SubCutaneous three times a day before meals  insulin lispro (ADMELOG) corrective regimen sliding scale   SubCutaneous at bedtime  losartan 100 milliGRAM(s) Oral daily  melatonin 3 milliGRAM(s) Oral at bedtime PRN  nystatin Powder 1 Application(s) Topical three times a day  ondansetron Injectable 4 milliGRAM(s) IV Push every 8 hours PRN  pantoprazole    Tablet 40 milliGRAM(s) Oral before breakfast       REVIEW OF SYSTEMS:  CONSTITUTIONAL:  No Fever or chills  CARDIOVASCULAR:  No chest pain or SOB.  RESPIRATORY:  No cough, shortness of breath  GASTROINTESTINAL:  No nausea, vomiting or diarrhea. no abdominal pain  GENITOURINARY:  No dysuria  MUSCULOSKELETAL:  no back pain  NEUROLOGIC:  No headache, no dizziness  PSYCHIATRIC:  No disorder of thought or mood.     Physical Exam:  ICU Vital Signs Last 24 Hrs  T(C): 36.6 (2022 13:35), Max: 36.6 (2022 20:23)  T(F): 97.8 (2022 13:35), Max: 97.9 (2022 20:23)  HR: 86 (2022 13:35) (78 - 93)  BP: 117/74 (2022 13:35) (117/74 - 163/80)  BP(mean): --  ABP: --  ABP(mean): --  RR: 18 (2022 13:35) (17 - 18)  SpO2: 92% (2022 13:35) (92% - 95%)     GEN: NAD  HEENT: normocephalic and atraumatic.   NECK: Supple.    LUNGS: Normal respiratory effort  HEART: Regular rate and rhythm   ABDOMEN: Soft, nontender, and nondistended.    EXTREMITIES: No leg edema.  NEUROLOGIC: answering simple questions appropriately, following commands  PSYCHIATRIC: Appropriate affect .    Labs:      138  |  102  |  15  ----------------------------<  326<H>  3.7   |  28  |  0.61    Ca    8.8      2022 10:25    TPro  7.0  /  Alb  2.6<L>  /  TBili  0.4  /  DBili  x   /  AST  21  /  ALT  38  /  AlkPhos  117                            12.5   16.42 )-----------( 461      ( 2022 10:25 )             37.8     PT/INR - ( 2022 18:51 )   PT: 13.6 sec;   INR: 1.17 ratio         PTT - ( 2022 18:51 )  PTT:30.3 sec  Urinalysis Basic - ( 2022 21:29 )    Color: Pale Yellow / Appearance: Slightly Turbid / S.005 / pH: x  Gluc: x / Ketone: Negative  / Bili: Negative / Urobili: Negative   Blood: x / Protein: 15 / Nitrite: Negative   Leuk Esterase: Small / RBC: 0-2 /HPF / WBC 0-2   Sq Epi: x / Non Sq Epi: Occasional / Bacteria: Occasional      LIVER FUNCTIONS - ( 2022 05:23 )  Alb: 2.6 g/dL / Pro: 7.0 g/dL / ALK PHOS: 117 U/L / ALT: 38 U/L / AST: 21 U/L / GGT: x             RECENT CULTURES:   @ 00:55 .Blood Blood-Peripheral     No growth to date.         @ 00:36 Clean Catch Clean Catch (Midstream)     <10,000 CFU/mL Normal Urogenital Deloris              All imaging and data are reviewed.

## 2022-01-21 NOTE — PROGRESS NOTE ADULT - ASSESSMENT
64 y/o F PMH CVA (1/2021, w/ residual L sided weakness, on ASA, plavix), leukemia (not on active treatment for daughter), T2DM, asthma, who presented with worsening sacral decubitus ulcer and weakness for 3 days. Found to be COVID positive.     She remains afebrile and on room air and denies any respiratory symptoms. S/p sotrovimab infusion yesterday. WBC stable at 16 and blood cultures currently no growth.    -AC per institution protocol  -apply clotrimazole cream on groin area BID for 2-4 weeks, duration pending resolution   -follow blood cultures to completion  -wound care  -will sign off, please call ID if any further questions. Thank you.    Taylor Perdomo MD  Division of Infectious Diseases   Cell [phone] between 8am and 6pm   After 6pm and weekends please call ID service at [phone]
62 y/o F PMH CVA (1/2021, w/ residual L sided weakness, on ASA, plavix), leukemia (unknown type), T2DM, HTN, asthma, GERD presenting with weakness for 3 days, leukocytosis , sacral pressure ulcer & fungal rash, + COVID 
64 y/o F PMH CVA (1/2021, w/ residual L sided weakness, on ASA, plavix), leukemia (unknown type), T2DM, HTN, asthma, GERD presenting with weakness for 3 days, leukocytosis, sacral stage II pressure ulcer & fungal rash, + COVID.

## 2022-01-21 NOTE — PROGRESS NOTE ADULT - PROBLEM SELECTOR PLAN 2
-Pt with leukocytosis of unknown etiology, afebrile; appears to be chronic upon review of HIE  Daughter endorses patient has history of ?? leukemia/CLL  but cannot recall which type  As per daughter, patient is in remission, not on chemotherapy, slow progress, q 3 months follow up with H/O at [facility]   CBC in AM- leukocytosis appears to have lymphocytic predominance  STOP Abx as WBC to base line & sacral Pressure ulcer is NOT Infected, as per ID Dr Perdomo D/W -Pt with leukocytosis 2/2 CLL, afebrile; appears to be chronic upon review of HIE  Daughter endorses patient has history of ?? leukemia/CLL  but cannot recall which type  As per daughter, patient is in remission, not on chemotherapy, slow progress, q 3 months follow up with H/O at [facility]   CBC in AM- leukocytosis appears to have lymphocytic predominance  STOP Abx as WBC to base line & sacral Pressure ulcer is NOT Infected, as per ID Dr Perdomo D/W

## 2022-01-21 NOTE — PROGRESS NOTE ADULT - PROBLEM SELECTOR PLAN 3
SPO2 ~95% on RA  Will defer steroids, antivirals for now as asymptomatic , RA O2 Sat- Nl   Supportive care  Monitor respiratory status  ID Dr. Perdomo /Dr Matos- d/w   asymptomatic, pt on RA and pt received monoclonal antibodies yesterday; cleared from COVID standpoint by ID

## 2022-01-21 NOTE — DISCHARGE NOTE NURSING/CASE MANAGEMENT/SOCIAL WORK - NSDCPEFALRISK_GEN_ALL_CORE
For information on Fall & Injury Prevention, visit: [email or web address] OR  [email or web address] OR  https://www.cdc.gov/steadi/patient.html

## 2022-01-21 NOTE — PROGRESS NOTE ADULT - PROBLEM SELECTOR PLAN 4
Mostly on L groin area, likely due to diaper use/moisture  -Nystatin powder on affected areas; Will be discharged on clotrimazole cream  -Avoid diapers d/w dtr

## 2022-04-12 NOTE — PROGRESS NOTE ADULT - SUBJECTIVE AND OBJECTIVE BOX
The patient is taking her calcium supplements and tolerating bisphosphonate therapy without any adverse reaction. CPM including regular exercise and follow DEXA scan at intervals. Further evaluation, treatment, and follow-up per orders, current med list, and patient instructions.     Date/Time Patient Seen:  		  Referring MD:   Data Reviewed	       Patient is a 63y old  Female who presents with a chief complaint of Pneumonia (06 Oct 2018 13:47)  in bed  seen and examined  vs and meds reviewed      Subjective/HPI     PAST MEDICAL & SURGICAL HISTORY:  Gastroesophageal reflux disease without esophagitis  Diabetes  HTN (hypertension)  Asthma  History of left cataract surgery  H/O  section  History of cholecystectomy  No significant past surgical history        Medication list         MEDICATIONS  (STANDING):  ALBUTerol/ipratropium for Nebulization 3 milliLiter(s) Nebulizer every 6 hours  aspirin enteric coated 81 milliGRAM(s) Oral daily  buDESOnide  80 MICROgram(s)/formoterol 4.5 MICROgram(s) Inhaler 2 Puff(s) Inhalation two times a day  carvedilol 6.25 milliGRAM(s) Oral every 12 hours  dextrose 5%. 1000 milliLiter(s) (50 mL/Hr) IV Continuous <Continuous>  dextrose 50% Injectable 12.5 Gram(s) IV Push once  dextrose 50% Injectable 25 Gram(s) IV Push once  dextrose 50% Injectable 25 Gram(s) IV Push once  diltiazem    milliGRAM(s) Oral daily  enoxaparin Injectable 40 milliGRAM(s) SubCutaneous every 24 hours  ergocalciferol 36124 Unit(s) Oral every week  famotidine    Tablet 20 milliGRAM(s) Oral daily  gabapentin 100 milliGRAM(s) Oral at bedtime  influenza   Vaccine 0.5 milliLiter(s) IntraMuscular once  insulin glargine Injectable (LANTUS) 38 Unit(s) SubCutaneous at bedtime  insulin lispro (HumaLOG) corrective regimen sliding scale   SubCutaneous Before meals and at bedtime  levoFLOXacin IVPB 500 milliGRAM(s) IV Intermittent every 24 hours  lisinopril 40 milliGRAM(s) Oral daily  multivitamin 1 Tablet(s) Oral daily  pantoprazole    Tablet 40 milliGRAM(s) Oral before breakfast  predniSONE   Tablet 20 milliGRAM(s) Oral daily  sodium chloride 0.9%. 1000 milliLiter(s) (75 mL/Hr) IV Continuous <Continuous>    MEDICATIONS  (PRN):  dextrose 40% Gel 15 Gram(s) Oral once PRN Blood Glucose LESS THAN 70 milliGRAM(s)/deciliter  glucagon  Injectable 1 milliGRAM(s) IntraMuscular once PRN Glucose LESS THAN 70 milligrams/deciliter  guaiFENesin   Syrup  (Sugar-Free) 200 milliGRAM(s) Oral every 6 hours PRN Cough         Vitals log        ICU Vital Signs Last 24 Hrs  T(C): 36.9 (07 Oct 2018 04:51), Max: 37.2 (06 Oct 2018 14:31)  T(F): 98.5 (07 Oct 2018 04:51), Max: 98.9 (06 Oct 2018 14:31)  HR: 64 (07 Oct 2018 04:51) (64 - 106)  BP: 164/84 (07 Oct 2018 04:51) (143/74 - 174/93)  BP(mean): --  ABP: --  ABP(mean): --  RR: 17 (07 Oct 2018 04:51) (17 - 18)  SpO2: 94% (07 Oct 2018 04:51) (90% - 96%)           Input and Output:  I&O's Detail    06 Oct 2018 07:01  -  07 Oct 2018 07:00  --------------------------------------------------------  IN:    sodium chloride 0.9%.: 975 mL    Solution: 100 mL  Total IN: 1075 mL    OUT:  Total OUT: 0 mL    Total NET: 1075 mL          Lab Data                        12.5   23.25 )-----------( 397      ( 06 Oct 2018 06:48 )             38.3     10-06    140  |  102  |  13  ----------------------------<  318<H>  3.4<L>   |  28  |  0.68    Ca    8.6      06 Oct 2018 06:48    TPro  7.6  /  Alb  3.1<L>  /  TBili  0.3  /  DBili  x   /  AST  15  /  ALT  27  /  AlkPhos  129<H>  10-05            Review of Systems	      Objective     Physical Examination    heart s1s2  lung dec BS  abd soft  cn grossly int      Pertinent Lab findings & Imaging      Courtney:  NO   Adequate UO     I&O's Detail    06 Oct 2018 07:01  -  07 Oct 2018 07:00  --------------------------------------------------------  IN:    sodium chloride 0.9%.: 975 mL    Solution: 100 mL  Total IN: 1075 mL    OUT:  Total OUT: 0 mL    Total NET: 1075 mL               Discussed with:     Cultures:	        Radiology

## 2022-09-07 NOTE — PATIENT PROFILE ADULT - FUNCTIONAL ASSESSMENT - DAILY ACTIVITY 6.
Provider E-Visit time total (minutes): 11    Review of [place] Prescription Monitoring Program (): Today I have also reviewed the patient's history of controlled substance use, as provided by [place] licensed pharmacies and prescriber dispensers.  No unusual or outside prescriptions.    Last fill 8/11      
2 = A lot of assistance

## 2022-11-12 NOTE — ED ADULT NURSE NOTE - TEMPLATE
Patient follows some commands but is also agitated but mainly redirectable/alert Wound Check/Suture Removal

## 2022-11-30 NOTE — ED ADULT TRIAGE NOTE - NSWEIGHTCALCTOOLDRUG_GEN_A_CORE
RT Nebulizer Bronchodilator Protocol Note    There is a bronchodilator order in the chart from a provider indicating to follow the RT Bronchodilator Protocol and there is an Initiate RT Bronchodilator Protocol order as well (see protocol at bottom of note). CXR Findings:  No results found for this or any previous visit from the past 2 days. The findings from the last RT Protocol Assessment were as follows:  History of Pulmonary Disease: Smoker 15 pack years or more  Respiratory Pattern: Regular pattern and RR 12-20 bpm  Breath Sounds: Clear breath sounds  Cough: Strong, spontaneous, non-productive  Indication for Bronchodilator Therapy: Decreased or absent breath sounds  Bronchodilator Assessment Score: 1     Aerosolized bronchodilator medication orders have been revised according to the RT Nebulizer Bronchodilator Protocol below. Respiratory Therapist to perform RT Therapy Protocol Assessment initially then follow the protocol. Repeat RT Therapy Protocol Assessment PRN for score 0-3 or on second treatment, BID, and PRN for scores above 3. No Indications - adjust the frequency to every 6 hours PRN wheezing or bronchospasm, if no treatments needed after 48 hours then discontinue using Per Protocol order mode. If indication present, adjust the RT bronchodilator orders based on the Bronchodilator Assessment Score as indicated below. If a patient is on this medication at home then do not decrease Frequency below that used at home. 0-3 - enter or revise RT bronchodilator order(s) to equivalent RT Bronchodilator order with Frequency of every 4 hours PRN for wheezing or increased work of breathing using Per Protocol order mode. 4-6 - enter or revise RT Bronchodilator order(s) to two equivalent RT bronchodilator orders with one order with BID Frequency and one order with Frequency of every 4 hours PRN wheezing or increased work of breathing using Per Protocol order mode.          7-10 - enter or revise RT Bronchodilator order(s) to two equivalent RT bronchodilator orders with one order with TID Frequency and one order with Frequency of every 4 hours PRN wheezing or increased work of breathing using Per Protocol order mode. 11-13 - enter or revise RT Bronchodilator order(s) to one equivalent RT bronchodilator order with QID Frequency and an Albuterol order with Frequency of every 4 hours PRN wheezing or increased work of breathing using Per Protocol order mode. Greater than 13 - enter or revise RT Bronchodilator order(s) to one equivalent RT bronchodilator order with every 4 hours Frequency and an Albuterol order with Frequency of every 2 hours PRN wheezing or increased work of breathing using Per Protocol order mode. RT to enter RT Home Evaluation for COPD & MDI Assessment order using Per Protocol order mode.     Electronically signed by RT Jossy on 11/30/2022 at 12:16 PM  used

## 2023-09-19 ENCOUNTER — EMERGENCY (EMERGENCY)
Facility: HOSPITAL | Age: 68
LOS: 1 days | Discharge: HOME | End: 2023-09-19
Attending: EMERGENCY MEDICINE | Admitting: EMERGENCY MEDICINE
Payer: COMMERCIAL

## 2023-09-19 VITALS
OXYGEN SATURATION: 99 % | RESPIRATION RATE: 15 BRPM | SYSTOLIC BLOOD PRESSURE: 125 MMHG | HEART RATE: 69 BPM | TEMPERATURE: 98 F | DIASTOLIC BLOOD PRESSURE: 75 MMHG

## 2023-09-19 VITALS
RESPIRATION RATE: 16 BRPM | TEMPERATURE: 98 F | HEIGHT: 62 IN | SYSTOLIC BLOOD PRESSURE: 121 MMHG | WEIGHT: 134.92 LBS | HEART RATE: 75 BPM | DIASTOLIC BLOOD PRESSURE: 75 MMHG | OXYGEN SATURATION: 94 %

## 2023-09-19 DIAGNOSIS — Z90.49 ACQUIRED ABSENCE OF OTHER SPECIFIED PARTS OF DIGESTIVE TRACT: Chronic | ICD-10-CM

## 2023-09-19 DIAGNOSIS — Z98.89 OTHER SPECIFIED POSTPROCEDURAL STATES: Chronic | ICD-10-CM

## 2023-09-19 DIAGNOSIS — Z98.42 CATARACT EXTRACTION STATUS, LEFT EYE: Chronic | ICD-10-CM

## 2023-09-19 PROCEDURE — 70450 CT HEAD/BRAIN W/O DYE: CPT | Mod: MD

## 2023-09-19 PROCEDURE — 99284 EMERGENCY DEPT VISIT MOD MDM: CPT | Mod: 25

## 2023-09-19 PROCEDURE — 99284 EMERGENCY DEPT VISIT MOD MDM: CPT

## 2023-09-19 PROCEDURE — 70450 CT HEAD/BRAIN W/O DYE: CPT | Mod: 26,MD

## 2023-09-19 NOTE — ED PROVIDER NOTE - ENMT, MLM
small abrasion/bruise left face without swelling/deformity/bony tenderness; no racoon eyes; no tolbert sign; no nasal or oral injury

## 2023-09-19 NOTE — ED PROVIDER NOTE - CROS ED NEURO POS
[FreeTextEntry1] : Pt to set up consult with Dr Strauss.\par Also have follow up with Dr Alamo .\par No changes made today in medications. \par RTO 8 weeks. \par \par Dr Moreno on site , billed incident to service. 
residual left sided weakness

## 2023-09-19 NOTE — ED ADULT NURSE NOTE - OBJECTIVE STATEMENT
Pt is alert and oriented. Pt states that she fell out of her wheelchair and hit her head. Pt has an abrasio to the left cheek. Pt states that she did not lose consciousness. Pt denies blood thinners. Pt had a previous stroke and has residual left sided weakness. Pt has a slight headache. Pt denies sob, chest pain, nausea, vomiting, and dizziness. Pt resp are even and unlabored, skin color eduardo for race. Pt updated on plan of care.

## 2023-09-19 NOTE — ED PROVIDER NOTE - PATIENT PORTAL LINK FT
You can access the FollowMyHealth Patient Portal offered by [facility] by registering at the following website: [email or web address]. By joining [employer]’s FollowMyHealth portal, you will also be able to view your health information using other applications (apps) compatible with our system.

## 2023-09-19 NOTE — ED PROVIDER NOTE - CLINICAL SUMMARY MEDICAL DECISION MAKING FREE TEXT BOX
68-year-old female with history of CVA fell while getting out of wheelchair in the bathroom today complaining of injury to the left side of her face.  Denies headache LOC nausea vomiting visual disturbance neck or back pain or other injury or symptom.  Patient states she takes baby aspirin daily.  Her PCP is in the [place] where she lives.Physical exam vital signs stable afebrile no distress.  There is a minor abrasion on the left cheek but no tenderness or deformity.  Pupils equal round reactive to light extraocular muscles intact.  There is no scalp tenderness or deformity.  Neck is supple full range of motion intact nontender.  Chest abdomen pelvis atraumatic.  Extremities left arm contracture from prior CVA otherwise atraumatic full range of motion pulses and sensation intact.  Neuro left arm weakness.  Otherwise intact.  Impression is fall with facial contusion.  Rule out intracranial hemorrhage.  Plan is CT brain if negative discharged with head injury instructions and PCP follow-up.

## 2023-09-19 NOTE — ED PROVIDER NOTE - OBJECTIVE STATEMENT
68-year-old female with history of asthma, CVA with residual left-sided weakness, diabetes, GERD, hypertension brought to hospital for evaluation of head/face injury.  Patient was transferring from wheelchair to walker to go to the bathroom when she slipped off and fell, hit her left face.  Has small abrasion to left jaw from where her 8 dangling earring hit her face.  Denies LOC.  On low-dose aspirin daily.  Denies injuries elsewhere.    pmd in [place]

## 2023-09-19 NOTE — ED PROVIDER NOTE - NSFOLLOWUPINSTRUCTIONS_ED_ALL_ED_FT
Head Injury    WHAT YOU NEED TO KNOW:    What do I need to know about a head injury? A head injury can include your scalp, face, skull, or brain and range from mild to severe. Effects can appear immediately after the injury or develop later. The effects may last a short time or be permanent. Healthcare providers may want to check your recovery over time. Treatment may change as you recover or develop new health problems from the head injury.    What are the signs and symptoms of a head injury?    An open scalp or skin wound, swelling, or bruising    Mild to moderate headache    Dizziness or loss of balance    Nausea or vomiting    Ringing in the ears or neck pain    Confusion, especially right after the injury    Change in mood, such as feeling restless or irritable    Trouble thinking, remembering, or concentrating    Drowsiness or decreased amount of energy    Trouble sleeping  How is a head injury diagnosed?    Tell your healthcare provider about your injury and symptoms. The provider will do an exam to check your brain function. He or she will check how your pupils react to light. He or she will check your memory, hand grasp, and balance.    You may need x-rays, a CT scan, or an MRI to check for bleeding or major damage to your skull or brain. You may be given contrast liquid to help the pictures show up better. Tell the healthcare provider if you have ever had an allergic reaction to contrast liquid. Do not enter the MRI room with anything metal. Metal can cause serious injury. Tell the provider if you have any metal in or on your body.  How is a head injury treated? A mild head injury may not need to be treated. You may be given medicine to decrease pain. Other treatments may depend on how severe your head injury is. A concussion, hematoma (collection of blood), or traumatic brain injury may need both immediate and long-term treatment.    How can I manage my symptoms?    Rest or do quiet activities. Limit your time watching TV, using the computer, or doing tasks that require a lot of thinking. Slowly return to your normal activities as directed. Do not play sports or do activities that may cause you to get hit in the head. Ask your healthcare provider when you can return to sports.    Apply ice on your head for 15 to 20 minutes every hour or as directed. Use an ice pack, or put crushed ice in a plastic bag. Cover it with a towel before you apply it to your skin. Ice helps prevent tissue damage and decreases swelling and pain.    Have someone stay with you for 24 hours , or as directed. This person can monitor you for problems and call for help if needed. When you are awake, the person should ask you a few questions every few hours to see if you are thinking clearly. An example is to ask your name or address.  What can I do to prevent another head injury?    Wear a helmet that fits properly. Do this when you play sports, or ride a bike, scooter, or skateboard. Helmets help decrease your risk for a serious head injury. Talk to your healthcare provider about other ways you can protect yourself if you play sports.    Wear your seatbelt every time you are in a car. This helps lower your risk for a head injury if you are in a car accident.  Call your local emergency number (911 in the ), or have someone else call if:    You cannot be woken.    You have a seizure.    You stop responding to others or you faint.    You have blurry or double vision.    Your speech becomes slurred or confused.    You have arm or leg weakness, loss of feeling, or new problems with coordination.    Your pupils are larger than usual, or one pupil is a different size than the other.    You have blood or clear fluid coming out of your ears or nose.  When should I seek immediate care?    You have repeated or forceful vomiting.    You feel confused.    Your headache gets worse or becomes severe.    You or someone caring for you notices that you are harder to wake than usual.  When should I call my doctor?    Your symptoms last longer than 6 weeks after the injury.    You have questions or concerns about your condition or care.  CARE AGREEMENT:    You have the right to help plan your care. Learn about your health condition and how it may be treated. Discuss treatment options with your healthcare providers to decide what care you want to receive. You always have the right to refuse treatment.

## 2023-09-19 NOTE — ED ADULT NURSE NOTE - NSFALLRISKINTERV_ED_ALL_ED

## 2023-09-20 PROBLEM — I63.9 CEREBRAL INFARCTION, UNSPECIFIED: Chronic | Status: ACTIVE | Noted: 2022-01-19

## 2024-04-12 NOTE — ED ADULT NURSE NOTE - NS ED NURSE REPORT GIVEN TO FT
[Home] : at home, [unfilled] , at the time of the visit. [Medical Office: ([facility])___] : at the medical office located in  [Verbal consent obtained from patient] : the patient, [unfilled] [de-identified] : Pt asks for xanax renewal   Very stressed with work and family [place]

## 2025-03-01 NOTE — PROGRESS NOTE ADULT - REASON FOR ADMISSION
Infected sacral decubitus Ulcer, COVID
Take the muscle relaxer, patches, and tylenol for pain control. If not better, use the hydrocodone.   Follow-up with your primary care doctor for repeat evaluation. Return for any worsening symptoms or other concerns.   
Infected sacral decubitus Ulcer, COVID
Infected sacral decubitus Ulcer, COVID

## 2025-05-27 NOTE — ED PROVIDER NOTE - MEDICAL DECISION MAKING DETAILS
In an effort to ensure that our patients [employer], a Team Member has reviewed your chart and identified an opportunity to provide the best care possible. An attempt was made to discuss or schedule due or overdue Preventive or Chronic Condition care.Care Gaps identified: Diabetes A1c Testing.    The Outcome was Contact was not made, left message. We are attempting to schedule a follow up office visit. If you have any questions or need help with scheduling, contact your primary care provider..   Type of Appointment needed: Follow-up Visit    Needs pcp appt and labs.        Instructions      Return in about 3 months (around 5/13/2025).   64 Y/O F WITH HX OF ASTHMA, DM, HTN C/O PRODUCTIVE COUGH X 5 DAYS, WORSE AT NIGHT, WILL R/O PNA, R/O FLU; WILL GET EKG, CXR, DUONEBS, IVF, SOLUMEDROL, LABS, RE-ASSESS